# Patient Record
Sex: MALE | Race: BLACK OR AFRICAN AMERICAN | NOT HISPANIC OR LATINO | Employment: OTHER | RURAL
[De-identification: names, ages, dates, MRNs, and addresses within clinical notes are randomized per-mention and may not be internally consistent; named-entity substitution may affect disease eponyms.]

---

## 2021-05-17 ENCOUNTER — OFFICE VISIT (OUTPATIENT)
Dept: INTERNAL MEDICINE | Facility: CLINIC | Age: 86
End: 2021-05-17
Payer: MEDICARE

## 2021-05-17 VITALS
HEIGHT: 70 IN | RESPIRATION RATE: 16 BRPM | DIASTOLIC BLOOD PRESSURE: 86 MMHG | OXYGEN SATURATION: 100 % | HEART RATE: 74 BPM | BODY MASS INDEX: 22.62 KG/M2 | SYSTOLIC BLOOD PRESSURE: 170 MMHG | WEIGHT: 158 LBS

## 2021-05-17 DIAGNOSIS — I10 ESSENTIAL HYPERTENSION: Primary | Chronic | ICD-10-CM

## 2021-05-17 PROCEDURE — 99204 OFFICE O/P NEW MOD 45 MIN: CPT | Mod: PBBFAC | Performed by: INTERNAL MEDICINE

## 2021-05-17 PROCEDURE — 99212 PR OFFICE/OUTPT VISIT, EST, LEVL II, 10-19 MIN: ICD-10-PCS | Mod: S$PBB,,, | Performed by: INTERNAL MEDICINE

## 2021-05-17 PROCEDURE — 99212 OFFICE O/P EST SF 10 MIN: CPT | Mod: S$PBB,,, | Performed by: INTERNAL MEDICINE

## 2021-05-17 RX ORDER — TRAVOPROST OPHTHALMIC SOLUTION 0.04 MG/ML
1 SOLUTION OPHTHALMIC NIGHTLY
COMMUNITY
Start: 2021-03-12

## 2021-05-17 RX ORDER — HYDROCHLOROTHIAZIDE 25 MG/1
25 TABLET ORAL DAILY
COMMUNITY
Start: 2021-03-24 | End: 2022-02-02 | Stop reason: SDUPTHER

## 2021-05-17 RX ORDER — TIMOLOL MALEATE 5 MG/ML
SOLUTION/ DROPS OPHTHALMIC
COMMUNITY
Start: 2021-03-12

## 2021-05-17 RX ORDER — LISINOPRIL 40 MG/1
20 TABLET ORAL DAILY
COMMUNITY
End: 2022-02-02

## 2021-05-17 RX ORDER — BICALUTAMIDE 50 MG/1
50 TABLET, FILM COATED ORAL DAILY
COMMUNITY
Start: 2021-03-08 | End: 2022-02-02 | Stop reason: SDUPTHER

## 2021-05-17 RX ORDER — TERAZOSIN 2 MG/1
2 CAPSULE ORAL NIGHTLY
COMMUNITY
End: 2021-11-02 | Stop reason: SDUPTHER

## 2021-06-07 ENCOUNTER — OFFICE VISIT (OUTPATIENT)
Dept: INTERNAL MEDICINE | Facility: CLINIC | Age: 86
End: 2021-06-07
Payer: MEDICARE

## 2021-06-07 VITALS
SYSTOLIC BLOOD PRESSURE: 160 MMHG | HEIGHT: 69 IN | HEART RATE: 64 BPM | BODY MASS INDEX: 23.11 KG/M2 | WEIGHT: 156 LBS | OXYGEN SATURATION: 100 % | DIASTOLIC BLOOD PRESSURE: 88 MMHG | RESPIRATION RATE: 17 BRPM

## 2021-06-07 DIAGNOSIS — I10 ESSENTIAL HYPERTENSION: Primary | Chronic | ICD-10-CM

## 2021-06-07 PROCEDURE — 99214 OFFICE O/P EST MOD 30 MIN: CPT | Mod: PBBFAC | Performed by: INTERNAL MEDICINE

## 2021-06-07 PROCEDURE — 99212 OFFICE O/P EST SF 10 MIN: CPT | Mod: S$PBB,,, | Performed by: INTERNAL MEDICINE

## 2021-06-07 PROCEDURE — 99212 PR OFFICE/OUTPT VISIT, EST, LEVL II, 10-19 MIN: ICD-10-PCS | Mod: S$PBB,,, | Performed by: INTERNAL MEDICINE

## 2021-07-28 ENCOUNTER — OFFICE VISIT (OUTPATIENT)
Dept: FAMILY MEDICINE | Facility: CLINIC | Age: 86
End: 2021-07-28
Payer: MEDICARE

## 2021-07-28 VITALS
HEIGHT: 69 IN | HEART RATE: 71 BPM | WEIGHT: 159 LBS | OXYGEN SATURATION: 99 % | BODY MASS INDEX: 23.55 KG/M2 | DIASTOLIC BLOOD PRESSURE: 90 MMHG | SYSTOLIC BLOOD PRESSURE: 152 MMHG | RESPIRATION RATE: 18 BRPM

## 2021-07-28 DIAGNOSIS — I10 ESSENTIAL HYPERTENSION: Primary | ICD-10-CM

## 2021-07-28 PROCEDURE — 99212 OFFICE O/P EST SF 10 MIN: CPT | Mod: ,,, | Performed by: INTERNAL MEDICINE

## 2021-07-28 PROCEDURE — 99212 PR OFFICE/OUTPT VISIT, EST, LEVL II, 10-19 MIN: ICD-10-PCS | Mod: ,,, | Performed by: INTERNAL MEDICINE

## 2021-07-28 RX ORDER — LISINOPRIL 40 MG/1
40 TABLET ORAL DAILY
COMMUNITY
Start: 2021-07-02 | End: 2022-02-02

## 2021-10-27 RX ORDER — HYDROCHLOROTHIAZIDE 25 MG/1
25 TABLET ORAL DAILY
Qty: 30 TABLET | Refills: 3 | Status: SHIPPED | OUTPATIENT
Start: 2021-10-27 | End: 2022-01-31

## 2021-11-02 ENCOUNTER — OFFICE VISIT (OUTPATIENT)
Dept: FAMILY MEDICINE | Facility: CLINIC | Age: 86
End: 2021-11-02
Payer: MEDICARE

## 2021-11-02 VITALS
RESPIRATION RATE: 16 BRPM | DIASTOLIC BLOOD PRESSURE: 85 MMHG | HEIGHT: 69 IN | BODY MASS INDEX: 23.25 KG/M2 | SYSTOLIC BLOOD PRESSURE: 141 MMHG | OXYGEN SATURATION: 100 % | WEIGHT: 157 LBS | HEART RATE: 74 BPM

## 2021-11-02 DIAGNOSIS — N40.0 BENIGN PROSTATIC HYPERPLASIA, UNSPECIFIED WHETHER LOWER URINARY TRACT SYMPTOMS PRESENT: Chronic | ICD-10-CM

## 2021-11-02 DIAGNOSIS — I10 ESSENTIAL HYPERTENSION: Primary | Chronic | ICD-10-CM

## 2021-11-02 PROCEDURE — G0008 ADMIN INFLUENZA VIRUS VAC: HCPCS | Mod: ,,, | Performed by: INTERNAL MEDICINE

## 2021-11-02 PROCEDURE — 90662 FLU VACCINE - QUADRIVALENT - HIGH DOSE (65+) PRESERVATIVE FREE IM: ICD-10-PCS | Mod: ,,, | Performed by: INTERNAL MEDICINE

## 2021-11-02 PROCEDURE — 90662 IIV NO PRSV INCREASED AG IM: CPT | Mod: ,,, | Performed by: INTERNAL MEDICINE

## 2021-11-02 PROCEDURE — G0008 FLU VACCINE - QUADRIVALENT - HIGH DOSE (65+) PRESERVATIVE FREE IM: ICD-10-PCS | Mod: ,,, | Performed by: INTERNAL MEDICINE

## 2021-11-02 PROCEDURE — 99214 OFFICE O/P EST MOD 30 MIN: CPT | Mod: ,,, | Performed by: INTERNAL MEDICINE

## 2021-11-02 PROCEDURE — 99214 PR OFFICE/OUTPT VISIT, EST, LEVL IV, 30-39 MIN: ICD-10-PCS | Mod: ,,, | Performed by: INTERNAL MEDICINE

## 2021-11-02 RX ORDER — TERAZOSIN 2 MG/1
2 CAPSULE ORAL NIGHTLY
Qty: 90 CAPSULE | Refills: 1 | Status: SHIPPED | OUTPATIENT
Start: 2021-11-02 | End: 2022-08-08 | Stop reason: SDUPTHER

## 2022-02-02 ENCOUNTER — OFFICE VISIT (OUTPATIENT)
Dept: FAMILY MEDICINE | Facility: CLINIC | Age: 87
End: 2022-02-02
Payer: MEDICARE

## 2022-02-02 VITALS
SYSTOLIC BLOOD PRESSURE: 98 MMHG | TEMPERATURE: 98 F | DIASTOLIC BLOOD PRESSURE: 62 MMHG | BODY MASS INDEX: 23.58 KG/M2 | HEIGHT: 69 IN | OXYGEN SATURATION: 97 % | RESPIRATION RATE: 17 BRPM | HEART RATE: 82 BPM | WEIGHT: 159.19 LBS

## 2022-02-02 DIAGNOSIS — N40.0 BENIGN PROSTATIC HYPERPLASIA, UNSPECIFIED WHETHER LOWER URINARY TRACT SYMPTOMS PRESENT: ICD-10-CM

## 2022-02-02 DIAGNOSIS — I95.9 HYPOTENSION, UNSPECIFIED HYPOTENSION TYPE: Primary | ICD-10-CM

## 2022-02-02 DIAGNOSIS — I10 HYPERTENSION, UNSPECIFIED TYPE: Chronic | ICD-10-CM

## 2022-02-02 LAB
ANION GAP SERPL CALCULATED.3IONS-SCNC: 13 MMOL/L (ref 7–16)
BASOPHILS # BLD AUTO: 0.02 K/UL (ref 0–0.2)
BASOPHILS NFR BLD AUTO: 0.4 % (ref 0–1)
BUN SERPL-MCNC: 47 MG/DL (ref 7–18)
BUN/CREAT SERPL: 19 (ref 6–20)
CALCIUM SERPL-MCNC: 9.4 MG/DL (ref 8.5–10.1)
CHLORIDE SERPL-SCNC: 104 MMOL/L (ref 98–107)
CO2 SERPL-SCNC: 25 MMOL/L (ref 21–32)
CREAT SERPL-MCNC: 2.46 MG/DL (ref 0.7–1.3)
DIFFERENTIAL METHOD BLD: ABNORMAL
EOSINOPHIL # BLD AUTO: 0.13 K/UL (ref 0–0.5)
EOSINOPHIL NFR BLD AUTO: 2.4 % (ref 1–4)
ERYTHROCYTE [DISTWIDTH] IN BLOOD BY AUTOMATED COUNT: 11.9 % (ref 11.5–14.5)
GLUCOSE SERPL-MCNC: 102 MG/DL (ref 74–106)
HCT VFR BLD AUTO: 31.6 % (ref 40–54)
HGB BLD-MCNC: 10.3 G/DL (ref 13.5–18)
IMM GRANULOCYTES # BLD AUTO: 0.01 K/UL (ref 0–0.04)
IMM GRANULOCYTES NFR BLD: 0.2 % (ref 0–0.4)
LYMPHOCYTES # BLD AUTO: 1.41 K/UL (ref 1–4.8)
LYMPHOCYTES NFR BLD AUTO: 26.4 % (ref 27–41)
MCH RBC QN AUTO: 31 PG (ref 27–31)
MCHC RBC AUTO-ENTMCNC: 32.6 G/DL (ref 32–36)
MCV RBC AUTO: 95.2 FL (ref 80–96)
MONOCYTES # BLD AUTO: 0.8 K/UL (ref 0–0.8)
MONOCYTES NFR BLD AUTO: 15 % (ref 2–6)
MPC BLD CALC-MCNC: 10.4 FL (ref 9.4–12.4)
NEUTROPHILS # BLD AUTO: 2.97 K/UL (ref 1.8–7.7)
NEUTROPHILS NFR BLD AUTO: 55.6 % (ref 53–65)
NRBC # BLD AUTO: 0 X10E3/UL
NRBC, AUTO (.00): 0 %
PLATELET # BLD AUTO: 201 K/UL (ref 150–400)
POTASSIUM SERPL-SCNC: 4.2 MMOL/L (ref 3.5–5.1)
RBC # BLD AUTO: 3.32 M/UL (ref 4.6–6.2)
SODIUM SERPL-SCNC: 138 MMOL/L (ref 136–145)
WBC # BLD AUTO: 5.34 K/UL (ref 4.5–11)

## 2022-02-02 PROCEDURE — 80048 BASIC METABOLIC PANEL: ICD-10-PCS | Mod: ,,, | Performed by: CLINICAL MEDICAL LABORATORY

## 2022-02-02 PROCEDURE — 99214 PR OFFICE/OUTPT VISIT, EST, LEVL IV, 30-39 MIN: ICD-10-PCS | Mod: ,,, | Performed by: INTERNAL MEDICINE

## 2022-02-02 PROCEDURE — 85025 CBC WITH DIFFERENTIAL: ICD-10-PCS | Mod: ,,, | Performed by: CLINICAL MEDICAL LABORATORY

## 2022-02-02 PROCEDURE — 85025 COMPLETE CBC W/AUTO DIFF WBC: CPT | Mod: ,,, | Performed by: CLINICAL MEDICAL LABORATORY

## 2022-02-02 PROCEDURE — 80048 BASIC METABOLIC PNL TOTAL CA: CPT | Mod: ,,, | Performed by: CLINICAL MEDICAL LABORATORY

## 2022-02-02 PROCEDURE — 99214 OFFICE O/P EST MOD 30 MIN: CPT | Mod: ,,, | Performed by: INTERNAL MEDICINE

## 2022-02-02 RX ORDER — HYDROCHLOROTHIAZIDE 25 MG/1
25 TABLET ORAL DAILY
Qty: 90 TABLET | Refills: 1 | Status: SHIPPED | OUTPATIENT
Start: 2022-02-02 | End: 2022-05-17 | Stop reason: SDUPTHER

## 2022-02-02 RX ORDER — LISINOPRIL 20 MG/1
20 TABLET ORAL EVERY MORNING
Qty: 90 TABLET | Refills: 1 | Status: SHIPPED | OUTPATIENT
Start: 2022-02-02 | End: 2022-07-27 | Stop reason: SDUPTHER

## 2022-02-02 RX ORDER — LISINOPRIL 40 MG/1
20 TABLET ORAL DAILY
Qty: 90 TABLET | Refills: 1 | Status: CANCELLED | OUTPATIENT
Start: 2022-02-02

## 2022-02-02 RX ORDER — BICALUTAMIDE 50 MG/1
50 TABLET, FILM COATED ORAL DAILY
Qty: 90 TABLET | Refills: 1 | Status: SHIPPED | OUTPATIENT
Start: 2022-02-02 | End: 2022-08-17 | Stop reason: SDUPTHER

## 2022-02-02 NOTE — PATIENT INSTRUCTIONS
Khoa was seen today for follow-up, hypertension and medication refill.    Diagnoses and all orders for this visit:    Hypotension, unspecified hypotension type  Comments:  Decrease med    Benign prostatic hyperplasia, unspecified whether lower urinary tract symptoms present  Comments:  Labs ordered    Hypertension, unspecified type  -     Basic Metabolic Panel; Future  -     CBC Auto Differential; Future  -     Basic Metabolic Panel  -     CBC Auto Differential    Other orders  The following orders have not been finalized:  -     bicalutamide (CASODEX) 50 MG Tab  -     hydroCHLOROthiazide (HYDRODIURIL) 25 MG tablet  -     Cancel: lisinopriL (PRINIVIL,ZESTRIL) 40 MG tablet  -     lisinopriL (PRINIVIL,ZESTRIL) 20 MG tablet

## 2022-02-02 NOTE — PROGRESS NOTES
Subjective:       Patient ID: Khoa Phan is a 89 y.o. male.    Chief Complaint: Follow-up, Hypertension, and Medication Refill    Patient is here for follow up evaluation. Blood pressure is 98/62 today. Will order in house labs today. Will decrease Lisinopril to 20mg one PO QAM. Follow up in 2 weeks.       Current Medications:    Current Outpatient Medications:     bicalutamide (CASODEX) 50 MG Tab, Take 50 mg by mouth once daily., Disp: , Rfl:     hydroCHLOROthiazide (HYDRODIURIL) 25 MG tablet, Take 25 mg by mouth once daily., Disp: , Rfl:     hydroCHLOROthiazide (HYDRODIURIL) 25 MG tablet, TAKE 1 TABLET(25 MG) BY MOUTH EVERY DAY, Disp: 30 tablet, Rfl: 3    terazosin (HYTRIN) 2 MG capsule, Take 1 capsule (2 mg total) by mouth every evening., Disp: 90 capsule, Rfl: 1    timolol maleate 0.5% (TIMOPTIC) 0.5 % Drop, INSTILL 1 DROP INTO BOTH EYES IN THE MORNING, Disp: , Rfl:     travoprost (TRAVATAN Z) 0.004 % ophthalmic solution, Place 1 drop into both eyes nightly., Disp: , Rfl:     Last Labs:     No visits with results within 1 Month(s) from this visit.   Latest known visit with results is:   No results found for any previous visit.       Last Imaging:  No image results found.         Review of Systems   All other systems reviewed and are negative.        Objective:      Physical Exam  Vitals reviewed.   Constitutional:       Appearance: Normal appearance. He is obese.   Cardiovascular:      Rate and Rhythm: Normal rate and regular rhythm.      Pulses: Normal pulses.      Heart sounds: Normal heart sounds.   Pulmonary:      Effort: Pulmonary effort is normal.      Breath sounds: Normal breath sounds.   Abdominal:      General: Abdomen is flat. Bowel sounds are normal.      Palpations: Abdomen is soft.   Musculoskeletal:         General: Normal range of motion.      Cervical back: Normal range of motion and neck supple.   Skin:     General: Skin is warm and dry.   Neurological:      General: No focal  deficit present.      Mental Status: He is alert and oriented to person, place, and time. Mental status is at baseline.         Assessment:       1. Hypotension, unspecified hypotension type      Decrease med   2. Benign prostatic hyperplasia, unspecified whether lower urinary tract symptoms present      Labs ordered   3. Hypertension, unspecified type  Basic Metabolic Panel    CBC Auto Differential    Basic Metabolic Panel    CBC Auto Differential        Plan:         Khoa was seen today for follow-up, hypertension and medication refill.    Diagnoses and all orders for this visit:    Hypotension, unspecified hypotension type  Comments:  Decrease med    Benign prostatic hyperplasia, unspecified whether lower urinary tract symptoms present  Comments:  Labs ordered    Hypertension, unspecified type  -     Basic Metabolic Panel; Future  -     CBC Auto Differential; Future  -     Basic Metabolic Panel  -     CBC Auto Differential    Other orders  The following orders have not been finalized:  -     bicalutamide (CASODEX) 50 MG Tab  -     hydroCHLOROthiazide (HYDRODIURIL) 25 MG tablet  -     Cancel: lisinopriL (PRINIVIL,ZESTRIL) 40 MG tablet  -     lisinopriL (PRINIVIL,ZESTRIL) 20 MG tablet       Follow up in 2 weeks.

## 2022-02-17 ENCOUNTER — OFFICE VISIT (OUTPATIENT)
Dept: FAMILY MEDICINE | Facility: CLINIC | Age: 87
End: 2022-02-17
Payer: MEDICARE

## 2022-02-17 VITALS
RESPIRATION RATE: 16 BRPM | OXYGEN SATURATION: 98 % | DIASTOLIC BLOOD PRESSURE: 72 MMHG | HEART RATE: 75 BPM | BODY MASS INDEX: 26.49 KG/M2 | WEIGHT: 159 LBS | TEMPERATURE: 99 F | SYSTOLIC BLOOD PRESSURE: 118 MMHG | HEIGHT: 65 IN

## 2022-02-17 DIAGNOSIS — N18.31 STAGE 3A CHRONIC KIDNEY DISEASE: Chronic | ICD-10-CM

## 2022-02-17 DIAGNOSIS — N18.31 ANEMIA DUE TO STAGE 3A CHRONIC KIDNEY DISEASE: Chronic | ICD-10-CM

## 2022-02-17 DIAGNOSIS — D63.1 ANEMIA DUE TO STAGE 3A CHRONIC KIDNEY DISEASE: Chronic | ICD-10-CM

## 2022-02-17 DIAGNOSIS — I10 ESSENTIAL HYPERTENSION: Primary | Chronic | ICD-10-CM

## 2022-02-17 PROCEDURE — 99213 OFFICE O/P EST LOW 20 MIN: CPT | Mod: ,,, | Performed by: INTERNAL MEDICINE

## 2022-02-17 PROCEDURE — 99213 PR OFFICE/OUTPT VISIT, EST, LEVL III, 20-29 MIN: ICD-10-PCS | Mod: ,,, | Performed by: INTERNAL MEDICINE

## 2022-02-17 NOTE — PROGRESS NOTES
Subjective:       Patient ID: Khoa Phan is a 89 y.o. male.    Chief Complaint: Follow-up, Hypertension, and Results (Follow up abnormal labs. )    Patient is here for follow up evaluation. Reviewed previous labs with patient and noted abnormal kidney function. Patient has a history of chronic kidney disease and lab is consistent with this. Chronic Kidney Disease is stable. Patient has a history of anemia. Anemia is stable. Patient states he feels ok and has no complaints. Encouraged patient to stay hydrated. Will order labs for 6 weeks. Follow up in 3 months.      Current Medications:    Current Outpatient Medications:     bicalutamide (CASODEX) 50 MG Tab, Take 1 tablet (50 mg total) by mouth once daily., Disp: 90 tablet, Rfl: 1    hydroCHLOROthiazide (HYDRODIURIL) 25 MG tablet, TAKE 1 TABLET(25 MG) BY MOUTH EVERY DAY, Disp: 30 tablet, Rfl: 3    hydroCHLOROthiazide (HYDRODIURIL) 25 MG tablet, Take 1 tablet (25 mg total) by mouth once daily., Disp: 90 tablet, Rfl: 1    lisinopriL (PRINIVIL,ZESTRIL) 20 MG tablet, Take 1 tablet (20 mg total) by mouth every morning., Disp: 90 tablet, Rfl: 1    terazosin (HYTRIN) 2 MG capsule, Take 1 capsule (2 mg total) by mouth every evening., Disp: 90 capsule, Rfl: 1    timolol maleate 0.5% (TIMOPTIC) 0.5 % Drop, INSTILL 1 DROP INTO BOTH EYES IN THE MORNING, Disp: , Rfl:     travoprost (TRAVATAN Z) 0.004 % ophthalmic solution, Place 1 drop into both eyes nightly., Disp: , Rfl:     Last Labs:     Office Visit on 02/02/2022   Component Date Value    Sodium 02/02/2022 138     Potassium 02/02/2022 4.2     Chloride 02/02/2022 104     CO2 02/02/2022 25     Anion Gap 02/02/2022 13     Glucose 02/02/2022 102     BUN 02/02/2022 47*    Creatinine 02/02/2022 2.46*    BUN/Creatinine Ratio 02/02/2022 19     Calcium 02/02/2022 9.4     eGFR 02/02/2022 26*    WBC 02/02/2022 5.34     RBC 02/02/2022 3.32*    Hemoglobin 02/02/2022 10.3*    Hematocrit 02/02/2022 31.6*     MCV 02/02/2022 95.2     MCH 02/02/2022 31.0     MCHC 02/02/2022 32.6     RDW 02/02/2022 11.9     Platelet Count 02/02/2022 201     MPV 02/02/2022 10.4     Neutrophils % 02/02/2022 55.6     Lymphocytes % 02/02/2022 26.4*    Monocytes % 02/02/2022 15.0*    Eosinophils % 02/02/2022 2.4     Basophils % 02/02/2022 0.4     Immature Granulocytes % 02/02/2022 0.2     nRBC, Auto 02/02/2022 0.0     Neutrophils, Abs 02/02/2022 2.97     Lymphocytes, Absolute 02/02/2022 1.41     Monocytes, Absolute 02/02/2022 0.80     Eosinophils, Absolute 02/02/2022 0.13     Basophils, Absolute 02/02/2022 0.02     Immature Granulocytes, A* 02/02/2022 0.01     nRBC, Absolute 02/02/2022 0.00     Diff Type 02/02/2022 Auto        Last Imaging:  No image results found.         Review of Systems   All other systems reviewed and are negative.        Objective:      Physical Exam  Vitals reviewed.   Constitutional:       Appearance: Normal appearance. He is obese.   Cardiovascular:      Rate and Rhythm: Normal rate and regular rhythm.      Pulses: Normal pulses.      Heart sounds: Normal heart sounds.   Pulmonary:      Effort: Pulmonary effort is normal.      Breath sounds: Normal breath sounds.   Abdominal:      General: Abdomen is flat. Bowel sounds are normal.      Palpations: Abdomen is soft.   Musculoskeletal:         General: Normal range of motion.      Cervical back: Normal range of motion and neck supple.   Skin:     General: Skin is warm and dry.   Neurological:      General: No focal deficit present.      Mental Status: He is alert and oriented to person, place, and time. Mental status is at baseline.         Assessment:       1. Essential hypertension  Basic Metabolic Panel    Stable   2. Stage 3a chronic kidney disease      Stable   3. 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

## 2022-02-17 NOTE — PATIENT INSTRUCTIONS
Khoa was seen today for follow-up, hypertension and results.    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

## 2022-03-11 DIAGNOSIS — Z71.89 COMPLEX CARE COORDINATION: ICD-10-CM

## 2022-04-30 ENCOUNTER — EXTERNAL CHRONIC CARE MANAGEMENT (OUTPATIENT)
Dept: INTERNAL MEDICINE | Facility: CLINIC | Age: 87
End: 2022-04-30
Payer: MEDICARE

## 2022-04-30 PROCEDURE — G0511 CCM/BHI BY RHC/FQHC 20MIN MO: HCPCS | Mod: ,,, | Performed by: INTERNAL MEDICINE

## 2022-04-30 PROCEDURE — G0511 PR CHRONIC CARE MGMT, RHC OR FQHC ONLY, 20 MINS OR MORE: ICD-10-PCS | Mod: ,,, | Performed by: INTERNAL MEDICINE

## 2022-05-17 ENCOUNTER — OFFICE VISIT (OUTPATIENT)
Dept: FAMILY MEDICINE | Facility: CLINIC | Age: 87
End: 2022-05-17
Payer: MEDICARE

## 2022-05-17 VITALS
HEART RATE: 76 BPM | RESPIRATION RATE: 20 BRPM | WEIGHT: 157.63 LBS | DIASTOLIC BLOOD PRESSURE: 80 MMHG | SYSTOLIC BLOOD PRESSURE: 136 MMHG | OXYGEN SATURATION: 99 % | TEMPERATURE: 98 F | BODY MASS INDEX: 26.26 KG/M2 | HEIGHT: 65 IN

## 2022-05-17 DIAGNOSIS — D64.9 ANEMIA, UNSPECIFIED TYPE: ICD-10-CM

## 2022-05-17 DIAGNOSIS — I10 ESSENTIAL HYPERTENSION: Primary | ICD-10-CM

## 2022-05-17 DIAGNOSIS — N18.31 STAGE 3A CHRONIC KIDNEY DISEASE: ICD-10-CM

## 2022-05-17 LAB
ANION GAP SERPL CALCULATED.3IONS-SCNC: 11 MMOL/L (ref 7–16)
BUN SERPL-MCNC: 38 MG/DL (ref 7–18)
BUN/CREAT SERPL: 15 (ref 6–20)
CALCIUM SERPL-MCNC: 8.8 MG/DL (ref 8.5–10.1)
CHLORIDE SERPL-SCNC: 106 MMOL/L (ref 98–107)
CO2 SERPL-SCNC: 24 MMOL/L (ref 21–32)
CREAT SERPL-MCNC: 2.46 MG/DL (ref 0.7–1.3)
GLUCOSE SERPL-MCNC: 86 MG/DL (ref 74–106)
POTASSIUM SERPL-SCNC: 4 MMOL/L (ref 3.5–5.1)
SODIUM SERPL-SCNC: 137 MMOL/L (ref 136–145)

## 2022-05-17 PROCEDURE — 99214 OFFICE O/P EST MOD 30 MIN: CPT | Mod: ,,, | Performed by: INTERNAL MEDICINE

## 2022-05-17 PROCEDURE — 80048 BASIC METABOLIC PANEL: ICD-10-PCS | Mod: ,,, | Performed by: CLINICAL MEDICAL LABORATORY

## 2022-05-17 PROCEDURE — 80048 BASIC METABOLIC PNL TOTAL CA: CPT | Mod: ,,, | Performed by: CLINICAL MEDICAL LABORATORY

## 2022-05-17 PROCEDURE — 99214 PR OFFICE/OUTPT VISIT, EST, LEVL IV, 30-39 MIN: ICD-10-PCS | Mod: ,,, | Performed by: INTERNAL MEDICINE

## 2022-05-17 RX ORDER — HYDROCHLOROTHIAZIDE 25 MG/1
25 TABLET ORAL DAILY
Qty: 90 TABLET | Refills: 1 | Status: SHIPPED | OUTPATIENT
Start: 2022-05-17 | End: 2023-01-26

## 2022-05-17 NOTE — PROGRESS NOTES
Subjective:       Patient ID: Khoa Phan is a 89 y.o. male.    Chief Complaint: Follow-up and Hypertension    Patient is here today for a follow up evaluation. Patient pressure is stable today. Patient has no new complaints. Will refer to Nephrology. Will order lab work today. Will follow in 3 months.       Current Medications:    Current Outpatient Medications:     bicalutamide (CASODEX) 50 MG Tab, Take 1 tablet (50 mg total) by mouth once daily., Disp: 90 tablet, Rfl: 1    hydroCHLOROthiazide (HYDRODIURIL) 25 MG tablet, Take 1 tablet (25 mg total) by mouth once daily., Disp: 90 tablet, Rfl: 1    lisinopriL (PRINIVIL,ZESTRIL) 20 MG tablet, Take 1 tablet (20 mg total) by mouth every morning., Disp: 90 tablet, Rfl: 1    terazosin (HYTRIN) 2 MG capsule, Take 1 capsule (2 mg total) by mouth every evening., Disp: 90 capsule, Rfl: 1    timolol maleate 0.5% (TIMOPTIC) 0.5 % Drop, INSTILL 1 DROP INTO BOTH EYES IN THE MORNING, Disp: , Rfl:     travoprost (TRAVATAN Z) 0.004 % ophthalmic solution, Place 1 drop into both eyes nightly., Disp: , Rfl:     Last Labs:     No visits with results within 1 Month(s) from this visit.   Latest known visit with results is:   Office Visit on 02/02/2022   Component Date Value    Sodium 02/02/2022 138     Potassium 02/02/2022 4.2     Chloride 02/02/2022 104     CO2 02/02/2022 25     Anion Gap 02/02/2022 13     Glucose 02/02/2022 102     BUN 02/02/2022 47 (A)    Creatinine 02/02/2022 2.46 (A)    BUN/Creatinine Ratio 02/02/2022 19     Calcium 02/02/2022 9.4     eGFR 02/02/2022 26 (A)    WBC 02/02/2022 5.34     RBC 02/02/2022 3.32 (A)    Hemoglobin 02/02/2022 10.3 (A)    Hematocrit 02/02/2022 31.6 (A)    MCV 02/02/2022 95.2     MCH 02/02/2022 31.0     MCHC 02/02/2022 32.6     RDW 02/02/2022 11.9     Platelet Count 02/02/2022 201     MPV 02/02/2022 10.4     Neutrophils % 02/02/2022 55.6     Lymphocytes % 02/02/2022 26.4 (A)    Monocytes % 02/02/2022 15.0 (A)     Eosinophils % 02/02/2022 2.4     Basophils % 02/02/2022 0.4     Immature Granulocytes % 02/02/2022 0.2     nRBC, Auto 02/02/2022 0.0     Neutrophils, Abs 02/02/2022 2.97     Lymphocytes, Absolute 02/02/2022 1.41     Monocytes, Absolute 02/02/2022 0.80     Eosinophils, Absolute 02/02/2022 0.13     Basophils, Absolute 02/02/2022 0.02     Immature Granulocytes, A* 02/02/2022 0.01     nRBC, Absolute 02/02/2022 0.00     Diff Type 02/02/2022 Auto        Last Imaging:  No image results found.         Review of Systems   All other systems reviewed and are negative.        Objective:      Physical Exam  Constitutional:       Appearance: Normal appearance. He is normal weight.   Cardiovascular:      Rate and Rhythm: Normal rate and regular rhythm.      Pulses: Normal pulses.      Heart sounds: Normal heart sounds.   Pulmonary:      Effort: Pulmonary effort is normal.      Breath sounds: Normal breath sounds.   Abdominal:      General: Abdomen is flat. Bowel sounds are normal.      Palpations: Abdomen is soft.   Musculoskeletal:         General: Normal range of motion.      Cervical back: Normal range of motion and neck supple.   Skin:     General: Skin is warm and dry.   Neurological:      General: No focal deficit present.      Mental Status: He is alert and oriented to person, place, and time. Mental status is at baseline.         Assessment:       1. Essential hypertension  Basic Metabolic Panel    Basic Metabolic Panel   2. Stage 3a chronic kidney disease  Basic Metabolic Panel    Ambulatory referral/consult to Nephrology    Basic Metabolic Panel   3. Anemia, unspecified type          Plan:         Khoa was seen today for follow-up and hypertension.    Diagnoses and all orders for this visit:    Essential hypertension  -     Basic Metabolic Panel; Future  -     Basic Metabolic Panel    Stage 3a chronic kidney disease  -     Basic Metabolic Panel; Future  -     Ambulatory referral/consult to Nephrology;  Future  -     Basic Metabolic Panel    Anemia, unspecified type    Other orders  The following orders have not been finalized:  -     hydroCHLOROthiazide (HYDRODIURIL) 25 MG tablet

## 2022-05-17 NOTE — PATIENT INSTRUCTIONS
Khoa was seen today for follow-up and hypertension.    Diagnoses and all orders for this visit:    Essential hypertension  -     Basic Metabolic Panel; Future  -     Basic Metabolic Panel    Stage 3a chronic kidney disease  -     Basic Metabolic Panel; Future  -     Ambulatory referral/consult to Nephrology; Future  -     Basic Metabolic Panel    Anemia, unspecified type    Other orders  The following orders have not been finalized:  -     hydroCHLOROthiazide (HYDRODIURIL) 25 MG tablet

## 2022-05-20 ENCOUNTER — HOSPITAL ENCOUNTER (EMERGENCY)
Facility: HOSPITAL | Age: 87
Discharge: HOME | End: 2022-05-20
Payer: MEDICARE

## 2022-05-20 VITALS
SYSTOLIC BLOOD PRESSURE: 111 MMHG | DIASTOLIC BLOOD PRESSURE: 60 MMHG | OXYGEN SATURATION: 97 % | TEMPERATURE: 98 F | HEART RATE: 76 BPM | RESPIRATION RATE: 18 BRPM | BODY MASS INDEX: 26.46 KG/M2 | WEIGHT: 155 LBS | HEIGHT: 64 IN

## 2022-05-20 DIAGNOSIS — M16.11 PRIMARY OSTEOARTHRITIS OF RIGHT HIP: ICD-10-CM

## 2022-05-20 DIAGNOSIS — M25.559 HIP PAIN: Primary | ICD-10-CM

## 2022-05-20 PROCEDURE — 96372 THER/PROPH/DIAG INJ SC/IM: CPT

## 2022-05-20 PROCEDURE — 99283 EMERGENCY DEPT VISIT LOW MDM: CPT | Mod: ,,, | Performed by: NURSE PRACTITIONER

## 2022-05-20 PROCEDURE — 99284 EMERGENCY DEPT VISIT MOD MDM: CPT

## 2022-05-20 PROCEDURE — 63600175 PHARM REV CODE 636 W HCPCS: Performed by: NURSE PRACTITIONER

## 2022-05-20 PROCEDURE — 99283 PR EMERGENCY DEPT VISIT,LEVEL III: ICD-10-PCS | Mod: ,,, | Performed by: NURSE PRACTITIONER

## 2022-05-20 RX ORDER — KETOROLAC TROMETHAMINE 30 MG/ML
15 INJECTION, SOLUTION INTRAMUSCULAR; INTRAVENOUS
Status: COMPLETED | OUTPATIENT
Start: 2022-05-20 | End: 2022-05-20

## 2022-05-20 RX ADMIN — KETOROLAC TROMETHAMINE 15 MG: 30 INJECTION, SOLUTION INTRAMUSCULAR at 11:05

## 2022-05-20 NOTE — ED TRIAGE NOTES
Patient presents with right hip pain that started Monday. Patient states he think he twisted wrong. No other injury noted.

## 2022-05-20 NOTE — ED PROVIDER NOTES
"Encounter Date: 5/20/2022       History     Chief Complaint   Patient presents with    Hip Pain     89 year old male presents to ED with complaint of right hip pain that started after he was throwing bushes/branches into a ditch. He denies fall onto hip or recent injury. Endorses pain to top of hip that he describes as "pain" and is unable to characterize the pain.     The history is provided by the patient and a relative.   Hip Pain  This is a new problem. The current episode started 3 to 5 hours ago. The problem occurs constantly. The problem has not changed since onset.The symptoms are aggravated by walking and twisting. Nothing relieves the symptoms. He has tried nothing for the symptoms. The treatment provided no relief.     Review of patient's allergies indicates:   Allergen Reactions    Alfuzosin      Other reaction(s): Unknown     Past Medical History:   Diagnosis Date    BPH (benign prostatic hyperplasia)     Hypertension      Past Surgical History:   Procedure Laterality Date    HEMORRHOID SURGERY       Family History   Problem Relation Age of Onset    Diabetes Brother     Stroke Brother     Heart disease Brother     Hypertension Neg Hx      Social History     Tobacco Use    Smoking status: Never Smoker    Smokeless tobacco: Never Used   Substance Use Topics    Alcohol use: Never    Drug use: Never     Review of Systems   Musculoskeletal: Positive for arthralgias. Negative for joint swelling.   Neurological: Positive for weakness. Negative for dizziness and numbness.   All other systems reviewed and are negative.      Physical Exam     Initial Vitals [05/20/22 1031]   BP Pulse Resp Temp SpO2   111/60 76 18 98.3 °F (36.8 °C) 97 %      MAP       --         Physical Exam    Nursing note and vitals reviewed.  Constitutional: He appears well-developed and well-nourished.   HENT:   Head: Normocephalic and atraumatic.   Eyes: EOM are normal. Pupils are equal, round, and reactive to light.   Neck: " Neck supple.   Normal range of motion.  Cardiovascular: Normal rate and regular rhythm.   Pulmonary/Chest: Breath sounds normal. He has no wheezes. He has no rhonchi.   Abdominal: Abdomen is soft. He exhibits no distension. There is no abdominal tenderness.   Musculoskeletal:         General: Tenderness present. No edema.      Cervical back: Normal range of motion and neck supple.      Right hip: Tenderness and bony tenderness present. Decreased range of motion.        Legs:       Comments: No deformity; no leg length discrepancy     Neurological: He is alert and oriented to person, place, and time.   Skin: Skin is warm and dry. Capillary refill takes 2 to 3 seconds.   Psychiatric: He has a normal mood and affect. Thought content normal.         Medical Screening Exam   See Full Note    ED Course   Procedures  Labs Reviewed - No data to display       Imaging Results          X-Ray Hip 2 or 3 views Right (with Pelvis when performed) (Final result)  Result time 05/20/22 10:58:21    Final result by Dee Fletcher MD (05/20/22 10:58:21)                 Impression:      Demineralization and mild degenerative changes      Electronically signed by: Dee Fletcher  Date:    05/20/2022  Time:    10:58             Narrative:    EXAMINATION:  XR HIP WITH PELVIS WHEN PERFORMED, 2 OR 3  VIEWS RIGHT    CLINICAL HISTORY:  pain;    FINDINGS:  There is diffuse demineralization.  There is mild osteophyte and superior joint space loss in the right hip.  There are vascular calcifications present.  No acute right hip fracture is seen.                                 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

## 2022-05-31 ENCOUNTER — EXTERNAL CHRONIC CARE MANAGEMENT (OUTPATIENT)
Dept: FAMILY MEDICINE | Facility: CLINIC | Age: 87
End: 2022-05-31
Payer: MEDICARE

## 2022-05-31 PROCEDURE — G0511 CCM/BHI BY RHC/FQHC 20MIN MO: HCPCS | Mod: ,,, | Performed by: INTERNAL MEDICINE

## 2022-05-31 PROCEDURE — G0511 PR CHRONIC CARE MGMT, RHC OR FQHC ONLY, 20 MINS OR MORE: ICD-10-PCS | Mod: ,,, | Performed by: INTERNAL MEDICINE

## 2022-06-30 ENCOUNTER — EXTERNAL CHRONIC CARE MANAGEMENT (OUTPATIENT)
Dept: FAMILY MEDICINE | Facility: CLINIC | Age: 87
End: 2022-06-30
Payer: MEDICARE

## 2022-06-30 PROCEDURE — G0511 CCM/BHI BY RHC/FQHC 20MIN MO: HCPCS | Mod: ,,, | Performed by: INTERNAL MEDICINE

## 2022-06-30 PROCEDURE — G0511 PR CHRONIC CARE MGMT, RHC OR FQHC ONLY, 20 MINS OR MORE: ICD-10-PCS | Mod: ,,, | Performed by: INTERNAL MEDICINE

## 2022-07-27 RX ORDER — LISINOPRIL 20 MG/1
20 TABLET ORAL EVERY MORNING
Qty: 90 TABLET | Refills: 1 | Status: SHIPPED | OUTPATIENT
Start: 2022-07-27 | End: 2022-10-25

## 2022-07-27 NOTE — TELEPHONE ENCOUNTER
----- Message from Diane Gibson sent at 7/27/2022  9:30 AM CDT -----  This pt son called about getting a refill on his dad's     lisinopriL (PRINIVIL,ZESTRIL) 20 MG tablet    Shruti (son) [phone]

## 2022-07-31 ENCOUNTER — EXTERNAL CHRONIC CARE MANAGEMENT (OUTPATIENT)
Dept: FAMILY MEDICINE | Facility: CLINIC | Age: 87
End: 2022-07-31
Payer: MEDICARE

## 2022-07-31 PROCEDURE — G0511 CCM/BHI BY RHC/FQHC 20MIN MO: HCPCS | Mod: ,,, | Performed by: INTERNAL MEDICINE

## 2022-07-31 PROCEDURE — G0511 PR CHRONIC CARE MGMT, RHC OR FQHC ONLY, 20 MINS OR MORE: ICD-10-PCS | Mod: ,,, | Performed by: INTERNAL MEDICINE

## 2022-08-08 NOTE — TELEPHONE ENCOUNTER
----- Message from Rosi Hernandez sent at 8/8/2022 10:33 AM CDT -----  Patient would like a refill of this medication    terazosin (HYTRIN) 2 MG capsule 90 capsule     Patient call back number - [phone]

## 2022-08-09 RX ORDER — TERAZOSIN 2 MG/1
2 CAPSULE ORAL NIGHTLY
Qty: 90 CAPSULE | Refills: 1 | Status: SHIPPED | OUTPATIENT
Start: 2022-08-09 | End: 2022-12-01 | Stop reason: SDUPTHER

## 2022-08-17 ENCOUNTER — OFFICE VISIT (OUTPATIENT)
Dept: FAMILY MEDICINE | Facility: CLINIC | Age: 87
End: 2022-08-17
Payer: MEDICARE

## 2022-08-17 VITALS
DIASTOLIC BLOOD PRESSURE: 84 MMHG | HEIGHT: 69 IN | SYSTOLIC BLOOD PRESSURE: 130 MMHG | HEART RATE: 68 BPM | TEMPERATURE: 98 F | RESPIRATION RATE: 18 BRPM | OXYGEN SATURATION: 98 % | WEIGHT: 153.81 LBS | BODY MASS INDEX: 22.78 KG/M2

## 2022-08-17 DIAGNOSIS — R79.9 ABNORMAL FINDING OF BLOOD CHEMISTRY, UNSPECIFIED: ICD-10-CM

## 2022-08-17 DIAGNOSIS — G56.92 NEUROPATHY OF LEFT HAND: ICD-10-CM

## 2022-08-17 DIAGNOSIS — N18.30 STAGE 3 CHRONIC KIDNEY DISEASE, UNSPECIFIED WHETHER STAGE 3A OR 3B CKD: Primary | ICD-10-CM

## 2022-08-17 LAB
CHOLEST SERPL-MCNC: 216 MG/DL (ref 0–200)
CHOLEST/HDLC SERPL: 3.3 {RATIO}
HDLC SERPL-MCNC: 66 MG/DL (ref 40–60)
LDLC SERPL CALC-MCNC: 134 MG/DL
LDLC/HDLC SERPL: 2 {RATIO}
NONHDLC SERPL-MCNC: 150 MG/DL
TRIGL SERPL-MCNC: 79 MG/DL (ref 35–150)
VLDLC SERPL-MCNC: 16 MG/DL

## 2022-08-17 PROCEDURE — G0009 ADMIN PNEUMOCOCCAL VACCINE: HCPCS | Mod: ,,, | Performed by: INTERNAL MEDICINE

## 2022-08-17 PROCEDURE — 90732 PPSV23 VACC 2 YRS+ SUBQ/IM: CPT | Mod: ,,, | Performed by: INTERNAL MEDICINE

## 2022-08-17 PROCEDURE — 80061 LIPID PANEL: ICD-10-PCS | Mod: ,,, | Performed by: CLINICAL MEDICAL LABORATORY

## 2022-08-17 PROCEDURE — G0009 PNEUMOCOCCAL POLYSACCHARIDE VACCINE 23-VALENT =>2YO SQ IM: ICD-10-PCS | Mod: ,,, | Performed by: INTERNAL MEDICINE

## 2022-08-17 PROCEDURE — 90732 PNEUMOCOCCAL POLYSACCHARIDE VACCINE 23-VALENT =>2YO SQ IM: ICD-10-PCS | Mod: ,,, | Performed by: INTERNAL MEDICINE

## 2022-08-17 PROCEDURE — 99214 PR OFFICE/OUTPT VISIT, EST, LEVL IV, 30-39 MIN: ICD-10-PCS | Mod: ,,, | Performed by: INTERNAL MEDICINE

## 2022-08-17 PROCEDURE — 80061 LIPID PANEL: CPT | Mod: ,,, | Performed by: CLINICAL MEDICAL LABORATORY

## 2022-08-17 PROCEDURE — 99214 OFFICE O/P EST MOD 30 MIN: CPT | Mod: ,,, | Performed by: INTERNAL MEDICINE

## 2022-08-17 RX ORDER — GABAPENTIN 100 MG/1
100 CAPSULE ORAL NIGHTLY
Qty: 30 CAPSULE | Refills: 2 | Status: SHIPPED | OUTPATIENT
Start: 2022-08-17 | End: 2022-12-01 | Stop reason: SDUPTHER

## 2022-08-17 RX ORDER — BICALUTAMIDE 50 MG/1
50 TABLET, FILM COATED ORAL DAILY
Qty: 90 TABLET | Refills: 1 | Status: SHIPPED | OUTPATIENT
Start: 2022-08-17 | End: 2024-01-18 | Stop reason: SDUPTHER

## 2022-08-17 NOTE — PROGRESS NOTES
Subjective:       Patient ID: Khoa Phan is a 89 y.o. male.    Chief Complaint: Hypertension, Hand Problem (Tingling/itch), and Medication Refill    Patient is here today for check up evaluation. Patient complaining of tingling and itching of the left hand. States has been ongoing for 1 week but does not bother him but every now and then. Denies numbness. Will prescribe Neurontin 100 mg PO QHS #30 RF2. Will follow in 2 weeks.    Care Gaps discussed and patient agrees to the following:  Lipid panel to be done today  Pneumonia vaccine  All others refused.      Current Medications:    Current Outpatient Medications:     bicalutamide (CASODEX) 50 MG Tab, Take 1 tablet (50 mg total) by mouth once daily., Disp: 90 tablet, Rfl: 1    hydroCHLOROthiazide (HYDRODIURIL) 25 MG tablet, Take 1 tablet (25 mg total) by mouth once daily., Disp: 90 tablet, Rfl: 1    lisinopriL (PRINIVIL,ZESTRIL) 20 MG tablet, Take 1 tablet (20 mg total) by mouth every morning., Disp: 90 tablet, Rfl: 1    terazosin (HYTRIN) 2 MG capsule, Take 1 capsule (2 mg total) by mouth every evening., Disp: 90 capsule, Rfl: 1    timolol maleate 0.5% (TIMOPTIC) 0.5 % Drop, INSTILL 1 DROP INTO BOTH EYES IN THE MORNING, Disp: , Rfl:     travoprost (TRAVATAN Z) 0.004 % ophthalmic solution, Place 1 drop into both eyes nightly., Disp: , Rfl:     Last Labs:     No visits with results within 1 Month(s) from this visit.   Latest known visit with results is:   Office Visit on 05/17/2022   Component Date Value    Sodium 05/17/2022 137     Potassium 05/17/2022 4.0     Chloride 05/17/2022 106     CO2 05/17/2022 24     Anion Gap 05/17/2022 11     Glucose 05/17/2022 86     BUN 05/17/2022 38 (A)    Creatinine 05/17/2022 2.46 (A)    BUN/Creatinine Ratio 05/17/2022 15     Calcium 05/17/2022 8.8     eGFR 05/17/2022 26 (A)       Last Imaging:  X-Ray Hip 2 or 3 views Right (with Pelvis when performed)  Narrative: EXAMINATION:  XR HIP WITH PELVIS WHEN  PERFORMED, 2 OR 3  VIEWS RIGHT    CLINICAL HISTORY:  pain;    FINDINGS:  There is diffuse demineralization.  There is mild osteophyte and superior joint space loss in the right hip.  There are vascular calcifications present.  No acute right hip fracture is seen.  Impression: Demineralization and mild degenerative changes    Electronically signed by: Dee Fletcher  Date:    05/20/2022  Time:    10:58         Review of Systems   All other systems reviewed and are negative.        Objective:      Physical Exam  Vitals reviewed.   Constitutional:       Appearance: Normal appearance.   Cardiovascular:      Rate and Rhythm: Normal rate and regular rhythm.      Pulses: Normal pulses.      Heart sounds: Normal heart sounds.   Pulmonary:      Effort: Pulmonary effort is normal.      Breath sounds: Normal breath sounds.   Abdominal:      General: Abdomen is flat. Bowel sounds are normal.      Palpations: Abdomen is soft.   Musculoskeletal:         General: Normal range of motion.      Cervical back: Normal range of motion and neck supple.   Skin:     General: Skin is warm and dry.   Neurological:      General: No focal deficit present.      Mental Status: He is alert and oriented to person, place, and time. Mental status is at baseline.         Assessment:       1. Stage 3 chronic kidney disease, unspecified whether stage 3a or 3b CKD  Lipid Panel    Lipid Panel   2. Neuropathy of left hand     3. Abnormal finding of blood chemistry, unspecified   Lipid Panel    Lipid Panel        Plan:         Khoa was seen today for hypertension, hand problem and medication refill.    Diagnoses and all orders for this visit:    Stage 3 chronic kidney disease, unspecified whether stage 3a or 3b CKD  -     Lipid Panel; Future  -     Lipid Panel    Neuropathy of left hand    Abnormal finding of blood chemistry, unspecified   -     Lipid Panel; Future  -     Lipid Panel    Other orders  -     (In Office Administered) Pneumococcal Polysaccharide  Vaccine (23 Valent) (SQ/IM)  The following orders have not been finalized:  -     bicalutamide (CASODEX) 50 MG Tab  -     gabapentin (NEURONTIN) 100 MG capsule

## 2022-08-17 NOTE — PATIENT INSTRUCTIONS
Khoa was seen today for hypertension, hand problem and medication refill.    Diagnoses and all orders for this visit:    Stage 3 chronic kidney disease, unspecified whether stage 3a or 3b CKD  -     Lipid Panel; Future  -     Lipid Panel    Neuropathy of left hand    Abnormal finding of blood chemistry, unspecified   -     Lipid Panel; Future  -     Lipid Panel    Other orders  -     (In Office Administered) Pneumococcal Polysaccharide Vaccine (23 Valent) (SQ/IM)  The following orders have not been finalized:  -     bicalutamide (CASODEX) 50 MG Tab  -     gabapentin (NEURONTIN) 100 MG capsule

## 2022-08-25 ENCOUNTER — TELEPHONE (OUTPATIENT)
Dept: FAMILY MEDICINE | Facility: CLINIC | Age: 87
End: 2022-08-25
Payer: MEDICARE

## 2022-08-25 NOTE — TELEPHONE ENCOUNTER
----- Message from Rosi Hernandez sent at 8/25/2022 10:30 AM CDT -----  Patient's child, Shruti is calling because  is thinking he was recommended to another doctor, and they are trying to figure out why.    Patient call back - [phone]        Called patient, no answer, not able to leave message.  Called number above, is patient's son Shruti (Narayan). He says his father received a letter from an internal medicine clinic with he address [address] He says the doctor's name was Kye Freeman. Son inquired if his father had been recommended to see another doctor. Informed Shruti the last note has patient to come back in 2 weeks to see Dr. Stafford. Voiced understanding.

## 2022-08-31 ENCOUNTER — OFFICE VISIT (OUTPATIENT)
Dept: FAMILY MEDICINE | Facility: CLINIC | Age: 87
End: 2022-08-31
Payer: MEDICARE

## 2022-08-31 ENCOUNTER — EXTERNAL CHRONIC CARE MANAGEMENT (OUTPATIENT)
Dept: FAMILY MEDICINE | Facility: CLINIC | Age: 87
End: 2022-08-31
Payer: MEDICARE

## 2022-08-31 VITALS
BODY MASS INDEX: 22.3 KG/M2 | HEIGHT: 70 IN | OXYGEN SATURATION: 98 % | WEIGHT: 155.81 LBS | HEART RATE: 86 BPM | RESPIRATION RATE: 16 BRPM | DIASTOLIC BLOOD PRESSURE: 66 MMHG | TEMPERATURE: 98 F | SYSTOLIC BLOOD PRESSURE: 130 MMHG

## 2022-08-31 DIAGNOSIS — G62.9 NEUROPATHY: Primary | ICD-10-CM

## 2022-08-31 PROCEDURE — G0511 PR CHRONIC CARE MGMT, RHC OR FQHC ONLY, 20 MINS OR MORE: ICD-10-PCS | Mod: ,,, | Performed by: INTERNAL MEDICINE

## 2022-08-31 PROCEDURE — G0511 CCM/BHI BY RHC/FQHC 20MIN MO: HCPCS | Mod: ,,, | Performed by: INTERNAL MEDICINE

## 2022-08-31 PROCEDURE — 99212 OFFICE O/P EST SF 10 MIN: CPT | Mod: ,,, | Performed by: INTERNAL MEDICINE

## 2022-08-31 PROCEDURE — 99212 PR OFFICE/OUTPT VISIT, EST, LEVL II, 10-19 MIN: ICD-10-PCS | Mod: ,,, | Performed by: INTERNAL MEDICINE

## 2022-08-31 NOTE — PROGRESS NOTES
Subjective:       Patient ID: Khoa Phan is a 89 y.o. male.    Chief Complaint: Hand Problem (Tingling and itching) and Follow-up    Patient is here today for a follow up evaluation. Patient blood pressure 130/66 today on intake. Patient has no new complaints. Patient states Neurontin did help with tingling of his hands. Will follow in 3 months.     Current Medications:    Current Outpatient Medications:     bicalutamide (CASODEX) 50 MG Tab, Take 1 tablet (50 mg total) by mouth once daily., Disp: 90 tablet, Rfl: 1    gabapentin (NEURONTIN) 100 MG capsule, Take 1 capsule (100 mg total) by mouth every evening., Disp: 30 capsule, Rfl: 2    hydroCHLOROthiazide (HYDRODIURIL) 25 MG tablet, Take 1 tablet (25 mg total) by mouth once daily., Disp: 90 tablet, Rfl: 1    lisinopriL (PRINIVIL,ZESTRIL) 20 MG tablet, Take 1 tablet (20 mg total) by mouth every morning., Disp: 90 tablet, Rfl: 1    terazosin (HYTRIN) 2 MG capsule, Take 1 capsule (2 mg total) by mouth every evening., Disp: 90 capsule, Rfl: 1    timolol maleate 0.5% (TIMOPTIC) 0.5 % Drop, INSTILL 1 DROP INTO BOTH EYES IN THE MORNING, Disp: , Rfl:     travoprost (TRAVATAN Z) 0.004 % ophthalmic solution, Place 1 drop into both eyes nightly., Disp: , Rfl:     Last Labs:     Office Visit on 08/17/2022   Component Date Value    Triglycerides 08/17/2022 79     Cholesterol 08/17/2022 216 (H)     HDL Cholesterol 08/17/2022 66 (H)     Cholesterol/HDL Ratio (R* 08/17/2022 3.3     Non-HDL 08/17/2022 150     LDL Calculated 08/17/2022 134     LDL/HDL 08/17/2022 2.0     VLDL 08/17/2022 16        Last Imaging:  X-Ray Hip 2 or 3 views Right (with Pelvis when performed)  Narrative: EXAMINATION:  XR HIP WITH PELVIS WHEN PERFORMED, 2 OR 3  VIEWS RIGHT    CLINICAL HISTORY:  pain;    FINDINGS:  There is diffuse demineralization.  There is mild osteophyte and superior joint space loss in the right hip.  There are vascular calcifications present.  No acute right hip fracture is  seen.  Impression: Demineralization and mild degenerative changes    Electronically signed by: Dee Fletcher  Date:    05/20/2022  Time:    10:58         Review of Systems   All other systems reviewed and are negative.      Objective:      Physical Exam  Constitutional:       Appearance: Normal appearance. He is normal weight.   Cardiovascular:      Rate and Rhythm: Normal rate and regular rhythm.      Pulses: Normal pulses.      Heart sounds: Normal heart sounds.   Pulmonary:      Effort: Pulmonary effort is normal.      Breath sounds: Normal breath sounds.   Abdominal:      General: Abdomen is flat. Bowel sounds are normal.      Palpations: Abdomen is soft.   Musculoskeletal:         General: Normal range of motion.      Cervical back: Normal range of motion and neck supple.   Skin:     General: Skin is warm and dry.   Neurological:      General: No focal deficit present.      Mental Status: He is alert and oriented to person, place, and time. Mental status is at baseline.       Assessment:       1. Neuropathy             Plan:         Khoa was seen today for hand problem and follow-up.    Diagnoses and all orders for this visit:    Neuropathy

## 2022-08-31 NOTE — PATIENT INSTRUCTIONS
Khoa was seen today for hand problem and follow-up.    Diagnoses and all orders for this visit:    Neuropathy

## 2022-10-31 ENCOUNTER — EXTERNAL CHRONIC CARE MANAGEMENT (OUTPATIENT)
Dept: FAMILY MEDICINE | Facility: CLINIC | Age: 87
End: 2022-10-31
Payer: MEDICARE

## 2022-10-31 PROCEDURE — G0511 PR CHRONIC CARE MGMT, RHC OR FQHC ONLY, 20 MINS OR MORE: ICD-10-PCS | Mod: ,,, | Performed by: INTERNAL MEDICINE

## 2022-10-31 PROCEDURE — G0511 CCM/BHI BY RHC/FQHC 20MIN MO: HCPCS | Mod: ,,, | Performed by: INTERNAL MEDICINE

## 2022-11-30 ENCOUNTER — EXTERNAL CHRONIC CARE MANAGEMENT (OUTPATIENT)
Dept: FAMILY MEDICINE | Facility: CLINIC | Age: 87
End: 2022-11-30
Payer: MEDICARE

## 2022-11-30 PROCEDURE — G0511 CCM/BHI BY RHC/FQHC 20MIN MO: HCPCS | Mod: ,,, | Performed by: INTERNAL MEDICINE

## 2022-11-30 PROCEDURE — G0511 PR CHRONIC CARE MGMT, RHC OR FQHC ONLY, 20 MINS OR MORE: ICD-10-PCS | Mod: ,,, | Performed by: INTERNAL MEDICINE

## 2022-12-01 ENCOUNTER — OFFICE VISIT (OUTPATIENT)
Dept: FAMILY MEDICINE | Facility: CLINIC | Age: 87
End: 2022-12-01
Payer: MEDICARE

## 2022-12-01 VITALS
WEIGHT: 157.38 LBS | RESPIRATION RATE: 16 BRPM | BODY MASS INDEX: 23.31 KG/M2 | SYSTOLIC BLOOD PRESSURE: 112 MMHG | HEART RATE: 80 BPM | TEMPERATURE: 98 F | HEIGHT: 69 IN | OXYGEN SATURATION: 97 % | DIASTOLIC BLOOD PRESSURE: 70 MMHG

## 2022-12-01 DIAGNOSIS — I10 ESSENTIAL HYPERTENSION: Primary | ICD-10-CM

## 2022-12-01 DIAGNOSIS — G62.9 NEUROPATHY: ICD-10-CM

## 2022-12-01 DIAGNOSIS — N40.0 BENIGN PROSTATIC HYPERPLASIA, UNSPECIFIED WHETHER LOWER URINARY TRACT SYMPTOMS PRESENT: ICD-10-CM

## 2022-12-01 PROCEDURE — 99214 PR OFFICE/OUTPT VISIT, EST, LEVL IV, 30-39 MIN: ICD-10-PCS | Mod: ,,, | Performed by: INTERNAL MEDICINE

## 2022-12-01 PROCEDURE — 99214 OFFICE O/P EST MOD 30 MIN: CPT | Mod: ,,, | Performed by: INTERNAL MEDICINE

## 2022-12-01 RX ORDER — TERAZOSIN 2 MG/1
2 CAPSULE ORAL NIGHTLY
Qty: 90 CAPSULE | Refills: 1 | Status: SHIPPED | OUTPATIENT
Start: 2022-12-01 | End: 2023-03-01 | Stop reason: SDUPTHER

## 2022-12-01 RX ORDER — GABAPENTIN 100 MG/1
100 CAPSULE ORAL NIGHTLY
Qty: 30 CAPSULE | Refills: 2 | Status: SHIPPED | OUTPATIENT
Start: 2022-12-01 | End: 2023-03-01 | Stop reason: SDUPTHER

## 2022-12-01 RX ORDER — LISINOPRIL 20 MG/1
20 TABLET ORAL EVERY MORNING
Qty: 90 TABLET | Refills: 1 | Status: SHIPPED | OUTPATIENT
Start: 2022-12-01 | End: 2023-03-01 | Stop reason: SDUPTHER

## 2022-12-01 NOTE — PATIENT INSTRUCTIONS
Khoa was seen today for medication refill.    Diagnoses and all orders for this visit:    Essential hypertension    Neuropathy    Benign prostatic hyperplasia, unspecified whether lower urinary tract symptoms present    Other orders  The following orders have not been finalized:  -     gabapentin (NEURONTIN) 100 MG capsule  -     lisinopriL (PRINIVIL,ZESTRIL) 20 MG tablet  -     terazosin (HYTRIN) 2 MG capsule

## 2022-12-01 NOTE — PROGRESS NOTES
Subjective:       Patient ID: Khoa Phan is a 89 y.o. male.    Chief Complaint: Medication Refill    Patient is here today for check up evaluation. Patient states doing well and has no new complaints. Requesting medication refills. Will follow in 3 months.     Medication Refill    Current Medications:    Current Outpatient Medications:     bicalutamide (CASODEX) 50 MG Tab, Take 1 tablet (50 mg total) by mouth once daily., Disp: 90 tablet, Rfl: 1    gabapentin (NEURONTIN) 100 MG capsule, Take 1 capsule (100 mg total) by mouth every evening., Disp: 30 capsule, Rfl: 2    hydroCHLOROthiazide (HYDRODIURIL) 25 MG tablet, Take 1 tablet (25 mg total) by mouth once daily., Disp: 90 tablet, Rfl: 1    lisinopriL (PRINIVIL,ZESTRIL) 20 MG tablet, TAKE 1 TABLET(20 MG) BY MOUTH EVERY MORNING, Disp: 90 tablet, Rfl: 1    terazosin (HYTRIN) 2 MG capsule, Take 1 capsule (2 mg total) by mouth every evening., Disp: 90 capsule, Rfl: 1    timolol maleate 0.5% (TIMOPTIC) 0.5 % Drop, INSTILL 1 DROP INTO BOTH EYES IN THE MORNING, Disp: , Rfl:     travoprost (TRAVATAN Z) 0.004 % ophthalmic solution, Place 1 drop into both eyes nightly., Disp: , Rfl:     Last Labs:     No visits with results within 1 Month(s) from this visit.   Latest known visit with results is:   Office Visit on 08/17/2022   Component Date Value    Triglycerides 08/17/2022 79     Cholesterol 08/17/2022 216 (H)     HDL Cholesterol 08/17/2022 66 (H)     Cholesterol/HDL Ratio (R* 08/17/2022 3.3     Non-HDL 08/17/2022 150     LDL Calculated 08/17/2022 134     LDL/HDL 08/17/2022 2.0     VLDL 08/17/2022 16        Last Imaging:  X-Ray Hip 2 or 3 views Right (with Pelvis when performed)  Narrative: EXAMINATION:  XR HIP WITH PELVIS WHEN PERFORMED, 2 OR 3  VIEWS RIGHT    CLINICAL HISTORY:  pain;    FINDINGS:  There is diffuse demineralization.  There is mild osteophyte and superior joint space loss in the right hip.  There are vascular calcifications present.  No acute right  hip fracture is seen.  Impression: Demineralization and mild degenerative changes    Electronically signed by: Dee Fletcher  Date:    05/20/2022  Time:    10:58         Review of Systems   All other systems reviewed and are negative.      Objective:      Physical Exam  Vitals reviewed.   Constitutional:       Appearance: Normal appearance.   Cardiovascular:      Rate and Rhythm: Normal rate and regular rhythm.      Pulses: Normal pulses.      Heart sounds: Normal heart sounds.   Pulmonary:      Effort: Pulmonary effort is normal.      Breath sounds: Normal breath sounds.   Abdominal:      General: Abdomen is flat. Bowel sounds are normal.      Palpations: Abdomen is soft.   Musculoskeletal:         General: Normal range of motion.      Cervical back: Normal range of motion and neck supple.   Skin:     General: Skin is warm and dry.   Neurological:      General: No focal deficit present.      Mental Status: He is alert and oriented to person, place, and time. Mental status is at baseline.       Assessment:       1. Essential hypertension        2. Neuropathy        3. Benign prostatic hyperplasia, unspecified whether lower urinary tract symptoms present             Plan:         Khoa was seen today for medication refill.    Diagnoses and all orders for this visit:    Essential hypertension    Neuropathy    Benign prostatic hyperplasia, unspecified whether lower urinary tract symptoms present    Other orders  The following orders have not been finalized:  -     gabapentin (NEURONTIN) 100 MG capsule  -     lisinopriL (PRINIVIL,ZESTRIL) 20 MG tablet  -     terazosin (HYTRIN) 2 MG capsule

## 2022-12-31 ENCOUNTER — EXTERNAL CHRONIC CARE MANAGEMENT (OUTPATIENT)
Dept: FAMILY MEDICINE | Facility: CLINIC | Age: 87
End: 2022-12-31
Payer: MEDICARE

## 2022-12-31 PROCEDURE — G0511 PR CHRONIC CARE MGMT, RHC OR FQHC ONLY, 20 MINS OR MORE: ICD-10-PCS | Mod: ,,, | Performed by: INTERNAL MEDICINE

## 2022-12-31 PROCEDURE — G0511 CCM/BHI BY RHC/FQHC 20MIN MO: HCPCS | Mod: ,,, | Performed by: INTERNAL MEDICINE

## 2023-01-31 ENCOUNTER — EXTERNAL CHRONIC CARE MANAGEMENT (OUTPATIENT)
Dept: FAMILY MEDICINE | Facility: CLINIC | Age: 88
End: 2023-01-31
Payer: MEDICARE

## 2023-01-31 PROCEDURE — G0511 PR CHRONIC CARE MGMT, RHC OR FQHC ONLY, 20 MINS OR MORE: ICD-10-PCS | Mod: ,,, | Performed by: INTERNAL MEDICINE

## 2023-01-31 PROCEDURE — G0511 CCM/BHI BY RHC/FQHC 20MIN MO: HCPCS | Mod: ,,, | Performed by: INTERNAL MEDICINE

## 2023-02-28 ENCOUNTER — EXTERNAL CHRONIC CARE MANAGEMENT (OUTPATIENT)
Dept: FAMILY MEDICINE | Facility: CLINIC | Age: 88
End: 2023-02-28
Payer: MEDICARE

## 2023-02-28 PROCEDURE — G0511 CCM/BHI BY RHC/FQHC 20MIN MO: HCPCS | Mod: ,,, | Performed by: INTERNAL MEDICINE

## 2023-02-28 PROCEDURE — G0511 PR CHRONIC CARE MGMT, RHC OR FQHC ONLY, 20 MINS OR MORE: ICD-10-PCS | Mod: ,,, | Performed by: INTERNAL MEDICINE

## 2023-03-01 ENCOUNTER — OFFICE VISIT (OUTPATIENT)
Dept: FAMILY MEDICINE | Facility: CLINIC | Age: 88
End: 2023-03-01
Payer: MEDICARE

## 2023-03-01 VITALS
OXYGEN SATURATION: 99 % | WEIGHT: 151.63 LBS | TEMPERATURE: 98 F | HEIGHT: 69 IN | DIASTOLIC BLOOD PRESSURE: 75 MMHG | HEART RATE: 68 BPM | SYSTOLIC BLOOD PRESSURE: 129 MMHG | RESPIRATION RATE: 17 BRPM | BODY MASS INDEX: 22.46 KG/M2

## 2023-03-01 DIAGNOSIS — I10 PRIMARY HYPERTENSION: Primary | ICD-10-CM

## 2023-03-01 DIAGNOSIS — G62.9 NEUROPATHY: ICD-10-CM

## 2023-03-01 DIAGNOSIS — N40.0 BENIGN PROSTATIC HYPERPLASIA, UNSPECIFIED WHETHER LOWER URINARY TRACT SYMPTOMS PRESENT: ICD-10-CM

## 2023-03-01 PROCEDURE — 99214 PR OFFICE/OUTPT VISIT, EST, LEVL IV, 30-39 MIN: ICD-10-PCS | Mod: ,,, | Performed by: INTERNAL MEDICINE

## 2023-03-01 PROCEDURE — 99214 OFFICE O/P EST MOD 30 MIN: CPT | Mod: ,,, | Performed by: INTERNAL MEDICINE

## 2023-03-01 RX ORDER — GABAPENTIN 100 MG/1
100 CAPSULE ORAL NIGHTLY
Qty: 30 CAPSULE | Refills: 2 | Status: SHIPPED | OUTPATIENT
Start: 2023-03-01 | End: 2023-04-20 | Stop reason: SDUPTHER

## 2023-03-01 RX ORDER — TERAZOSIN 2 MG/1
2 CAPSULE ORAL NIGHTLY
Qty: 90 CAPSULE | Refills: 1 | Status: SHIPPED | OUTPATIENT
Start: 2023-03-01 | End: 2023-06-05 | Stop reason: SDUPTHER

## 2023-03-01 RX ORDER — LISINOPRIL 20 MG/1
20 TABLET ORAL EVERY MORNING
Qty: 90 TABLET | Refills: 1 | Status: SHIPPED | OUTPATIENT
Start: 2023-03-01 | End: 2023-06-05 | Stop reason: SDUPTHER

## 2023-03-01 RX ORDER — HYDROCHLOROTHIAZIDE 25 MG/1
25 TABLET ORAL DAILY
Qty: 90 TABLET | Refills: 1 | Status: SHIPPED | OUTPATIENT
Start: 2023-03-01 | End: 2023-06-05 | Stop reason: SDUPTHER

## 2023-03-02 NOTE — PROGRESS NOTES
"Subjective:       Patient ID: Khoa Phan is a 89 y.o. male.    Chief Complaint: Follow-up    Patient is here today for a follow up evaluation. Patient blood pressure is stable today on intake, 129/75. Patient has no new complaints. Patient is requesting medication refills. Will refill today. Will follow with patient in 3 months.   .    Current Medications:    Current Outpatient Medications:     bicalutamide (CASODEX) 50 MG Tab, Take 1 tablet (50 mg total) by mouth once daily., Disp: 90 tablet, Rfl: 1    timolol maleate 0.5% (TIMOPTIC) 0.5 % Drop, INSTILL 1 DROP INTO BOTH EYES IN THE MORNING, Disp: , Rfl:     travoprost (TRAVATAN Z) 0.004 % ophthalmic solution, Place 1 drop into both eyes nightly., Disp: , Rfl:     gabapentin (NEURONTIN) 100 MG capsule, Take 1 capsule (100 mg total) by mouth every evening., Disp: 30 capsule, Rfl: 2    hydroCHLOROthiazide (HYDRODIURIL) 25 MG tablet, Take 1 tablet (25 mg total) by mouth once daily., Disp: 90 tablet, Rfl: 1    lisinopriL (PRINIVIL,ZESTRIL) 20 MG tablet, Take 1 tablet (20 mg total) by mouth every morning., Disp: 90 tablet, Rfl: 1    terazosin (HYTRIN) 2 MG capsule, Take 1 capsule (2 mg total) by mouth every evening., Disp: 90 capsule, Rfl: 1           Review of Systems   All other systems reviewed and are negative.             Vitals:    03/01/23 0916   BP: 129/75   BP Location: Right arm   Patient Position: Sitting   BP Method: Large (Automatic)   Pulse: 68   Resp: 17   Temp: 97.8 °F (36.6 °C)   TempSrc: Temporal   SpO2: 99%   Weight: 68.8 kg (151 lb 9.6 oz)   Height: 5' 9" (1.753 m)        Physical Exam  Constitutional:       Appearance: Normal appearance. He is normal weight.   Cardiovascular:      Rate and Rhythm: Normal rate and regular rhythm.      Pulses: Normal pulses.      Heart sounds: Normal heart sounds.   Pulmonary:      Effort: Pulmonary effort is normal.      Breath sounds: Normal breath sounds.   Abdominal:      General: Abdomen is flat. Bowel " sounds are normal.      Palpations: Abdomen is soft.   Musculoskeletal:         General: Normal range of motion.      Cervical back: Normal range of motion and neck supple.   Skin:     General: Skin is warm and dry.   Neurological:      General: No focal deficit present.      Mental Status: He is alert and oriented to person, place, and time. Mental status is at baseline.         Last Labs:     No visits with results within 1 Month(s) from this visit.   Latest known visit with results is:   Office Visit on 08/17/2022   Component Date Value    Triglycerides 08/17/2022 79     Cholesterol 08/17/2022 216 (H)     HDL Cholesterol 08/17/2022 66 (H)     Cholesterol/HDL Ratio (R* 08/17/2022 3.3     Non-HDL 08/17/2022 150     LDL Calculated 08/17/2022 134     LDL/HDL 08/17/2022 2.0     VLDL 08/17/2022 16        Last Imaging:  X-Ray Hip 2 or 3 views Right (with Pelvis when performed)  Narrative: EXAMINATION:  XR HIP WITH PELVIS WHEN PERFORMED, 2 OR 3  VIEWS RIGHT    CLINICAL HISTORY:  pain;    FINDINGS:  There is diffuse demineralization.  There is mild osteophyte and superior joint space loss in the right hip.  There are vascular calcifications present.  No acute right hip fracture is seen.  Impression: Demineralization and mild degenerative changes    Electronically signed by: Dee Fletcher  Date:    05/20/2022  Time:    10:58         **Labs and x-rays personally reviewed by me    ** reviewed      Objective:        Assessment:       1. Primary hypertension        2. Benign prostatic hyperplasia, unspecified whether lower urinary tract symptoms present        3. Neuropathy             Plan:         [unfilled]

## 2023-03-02 NOTE — PATIENT INSTRUCTIONS
Khoa was seen today for follow-up.    Diagnoses and all orders for this visit:    Primary hypertension    Benign prostatic hyperplasia, unspecified whether lower urinary tract symptoms present    Neuropathy    Other orders  -     gabapentin (NEURONTIN) 100 MG capsule; Take 1 capsule (100 mg total) by mouth every evening.  -     hydroCHLOROthiazide (HYDRODIURIL) 25 MG tablet; Take 1 tablet (25 mg total) by mouth once daily.  -     lisinopriL (PRINIVIL,ZESTRIL) 20 MG tablet; Take 1 tablet (20 mg total) by mouth every morning.  -     terazosin (HYTRIN) 2 MG capsule; Take 1 capsule (2 mg total) by mouth every evening.

## 2023-03-31 ENCOUNTER — EXTERNAL CHRONIC CARE MANAGEMENT (OUTPATIENT)
Dept: FAMILY MEDICINE | Facility: CLINIC | Age: 88
End: 2023-03-31
Payer: MEDICARE

## 2023-03-31 PROCEDURE — G0511 PR CHRONIC CARE MGMT, RHC OR FQHC ONLY, 20 MINS OR MORE: ICD-10-PCS | Mod: ,,, | Performed by: INTERNAL MEDICINE

## 2023-03-31 PROCEDURE — G0511 CCM/BHI BY RHC/FQHC 20MIN MO: HCPCS | Mod: ,,, | Performed by: INTERNAL MEDICINE

## 2023-04-20 RX ORDER — GABAPENTIN 100 MG/1
100 CAPSULE ORAL NIGHTLY
Qty: 30 CAPSULE | Refills: 2 | Status: SHIPPED | OUTPATIENT
Start: 2023-04-20 | End: 2023-06-05 | Stop reason: SDUPTHER

## 2023-04-20 NOTE — TELEPHONE ENCOUNTER
----- Message from Kimberly Iglesias sent at 4/19/2023  2:09 PM CDT -----  PT CALLED FOR REFILL    gabapentin (NEURONTIN) 100 MG capsule        [id number] - [place], [place] - [address] AT [facility] [address]        [phone]

## 2023-04-30 ENCOUNTER — EXTERNAL CHRONIC CARE MANAGEMENT (OUTPATIENT)
Dept: FAMILY MEDICINE | Facility: CLINIC | Age: 88
End: 2023-04-30
Payer: MEDICARE

## 2023-04-30 PROCEDURE — G0511 CCM/BHI BY RHC/FQHC 20MIN MO: HCPCS | Mod: ,,, | Performed by: INTERNAL MEDICINE

## 2023-04-30 PROCEDURE — G0511 PR CHRONIC CARE MGMT, RHC OR FQHC ONLY, 20 MINS OR MORE: ICD-10-PCS | Mod: ,,, | Performed by: INTERNAL MEDICINE

## 2023-05-31 ENCOUNTER — EXTERNAL CHRONIC CARE MANAGEMENT (OUTPATIENT)
Dept: FAMILY MEDICINE | Facility: CLINIC | Age: 88
End: 2023-05-31
Payer: MEDICARE

## 2023-05-31 PROCEDURE — G0511 CCM/BHI BY RHC/FQHC 20MIN MO: HCPCS | Mod: ,,, | Performed by: INTERNAL MEDICINE

## 2023-05-31 PROCEDURE — G0511 PR CHRONIC CARE MGMT, RHC OR FQHC ONLY, 20 MINS OR MORE: ICD-10-PCS | Mod: ,,, | Performed by: INTERNAL MEDICINE

## 2023-06-05 ENCOUNTER — OFFICE VISIT (OUTPATIENT)
Dept: FAMILY MEDICINE | Facility: CLINIC | Age: 88
End: 2023-06-05
Payer: MEDICARE

## 2023-06-05 VITALS
HEIGHT: 69 IN | RESPIRATION RATE: 17 BRPM | OXYGEN SATURATION: 98 % | WEIGHT: 150.63 LBS | SYSTOLIC BLOOD PRESSURE: 155 MMHG | BODY MASS INDEX: 22.31 KG/M2 | DIASTOLIC BLOOD PRESSURE: 96 MMHG | TEMPERATURE: 97 F | HEART RATE: 80 BPM

## 2023-06-05 DIAGNOSIS — I10 ESSENTIAL (PRIMARY) HYPERTENSION: Primary | ICD-10-CM

## 2023-06-05 DIAGNOSIS — N40.0 BENIGN PROSTATIC HYPERPLASIA, UNSPECIFIED WHETHER LOWER URINARY TRACT SYMPTOMS PRESENT: ICD-10-CM

## 2023-06-05 PROCEDURE — 99214 PR OFFICE/OUTPT VISIT, EST, LEVL IV, 30-39 MIN: ICD-10-PCS | Mod: ,,, | Performed by: INTERNAL MEDICINE

## 2023-06-05 PROCEDURE — 99214 OFFICE O/P EST MOD 30 MIN: CPT | Mod: ,,, | Performed by: INTERNAL MEDICINE

## 2023-06-05 RX ORDER — TERAZOSIN 2 MG/1
2 CAPSULE ORAL NIGHTLY
Qty: 90 CAPSULE | Refills: 1 | Status: SHIPPED | OUTPATIENT
Start: 2023-06-05 | End: 2023-09-11 | Stop reason: SDUPTHER

## 2023-06-05 RX ORDER — LISINOPRIL 20 MG/1
20 TABLET ORAL EVERY MORNING
Qty: 90 TABLET | Refills: 1 | Status: SHIPPED | OUTPATIENT
Start: 2023-06-05 | End: 2023-07-27 | Stop reason: SDUPTHER

## 2023-06-05 RX ORDER — GABAPENTIN 100 MG/1
100 CAPSULE ORAL NIGHTLY
Qty: 30 CAPSULE | Refills: 2 | Status: SHIPPED | OUTPATIENT
Start: 2023-06-05 | End: 2023-08-16 | Stop reason: SDUPTHER

## 2023-06-05 RX ORDER — HYDROCHLOROTHIAZIDE 25 MG/1
25 TABLET ORAL DAILY
Qty: 90 TABLET | Refills: 1 | Status: SHIPPED | OUTPATIENT
Start: 2023-06-05 | End: 2023-09-11 | Stop reason: SDUPTHER

## 2023-06-06 PROBLEM — N40.0 BENIGN PROSTATIC HYPERPLASIA: Status: ACTIVE | Noted: 2023-06-06

## 2023-06-06 PROBLEM — I10 ESSENTIAL (PRIMARY) HYPERTENSION: Status: ACTIVE | Noted: 2023-06-06

## 2023-06-06 NOTE — PROGRESS NOTES
No Subjective:       Patient ID: Khoa Phan is a 89 y.o. male.    Chief Complaint: Hypertension  89-year-old gentleman presents with hypertension which is chronic patient has chronic BPH.  Blood pressure is elevated 160/100 repeat is 155/96.  Patient stated his blood pressure is high because he ate fried chicken last night I will hold off on increasing his antihypertensive agents for now.  Medicines that will be refilled today will be gabapentin for his neuropathy hydrochlorothiazide, lisinopril, and Hytrin.  Hypertension  Pertinent negatives include no chest pain or shortness of breath.   .    Current Medications:    Current Outpatient Medications:     bicalutamide (CASODEX) 50 MG Tab, Take 1 tablet (50 mg total) by mouth once daily., Disp: 90 tablet, Rfl: 1    timolol maleate 0.5% (TIMOPTIC) 0.5 % Drop, INSTILL 1 DROP INTO BOTH EYES IN THE MORNING, Disp: , Rfl:     travoprost (TRAVATAN Z) 0.004 % ophthalmic solution, Place 1 drop into both eyes nightly., Disp: , Rfl:     gabapentin (NEURONTIN) 100 MG capsule, Take 1 capsule (100 mg total) by mouth every evening., Disp: 30 capsule, Rfl: 2    hydroCHLOROthiazide (HYDRODIURIL) 25 MG tablet, Take 1 tablet (25 mg total) by mouth once daily., Disp: 90 tablet, Rfl: 1    lisinopriL (PRINIVIL,ZESTRIL) 20 MG tablet, Take 1 tablet (20 mg total) by mouth every morning., Disp: 90 tablet, Rfl: 1    terazosin (HYTRIN) 2 MG capsule, Take 1 capsule (2 mg total) by mouth every evening., Disp: 90 capsule, Rfl: 1           Review of Systems   Constitutional:  Negative for appetite change and fatigue.   HENT:  Negative for nasal congestion and rhinorrhea.    Eyes:  Negative for redness and visual disturbance.   Respiratory:  Negative for cough and shortness of breath.    Cardiovascular:  Negative for chest pain and leg swelling.   Gastrointestinal:  Negative for abdominal pain, constipation and diarrhea.   Endocrine: Negative for polyuria.   Genitourinary:  Negative for difficulty  "urinating, dysuria and erectile dysfunction.   Musculoskeletal:  Negative for arthralgias and myalgias.   Integumentary:  Negative for rash and mole/lesion.   All other systems reviewed and are negative.             Vitals:    06/05/23 0926 06/05/23 0932   BP: (!) 166/100 (!) 155/96   BP Location: Right arm    Patient Position: Sitting    BP Method: Small (Manual)    Pulse: 80    Resp: 17    Temp: 97.4 °F (36.3 °C)    TempSrc: Temporal    SpO2: 98%    Weight: 68.3 kg (150 lb 9.6 oz)    Height: 5' 9" (1.753 m)         Physical Exam  Vitals and nursing note reviewed.   Constitutional:       Appearance: Normal appearance.   Cardiovascular:      Rate and Rhythm: Normal rate and regular rhythm.      Pulses: Normal pulses.      Heart sounds: Normal heart sounds.   Pulmonary:      Effort: Pulmonary effort is normal.      Breath sounds: Normal breath sounds.   Abdominal:      General: Abdomen is flat. Bowel sounds are normal.      Palpations: Abdomen is soft.   Musculoskeletal:         General: Normal range of motion.   Skin:     General: Skin is warm and dry.   Neurological:      General: No focal deficit present.      Mental Status: He is alert and oriented to person, place, and time. Mental status is at baseline.         Last Labs:     No visits with results within 1 Month(s) from this visit.   Latest known visit with results is:   Office Visit on 08/17/2022   Component Date Value    Triglycerides 08/17/2022 79     Cholesterol 08/17/2022 216 (H)     HDL Cholesterol 08/17/2022 66 (H)     Cholesterol/HDL Ratio (R* 08/17/2022 3.3     Non-HDL 08/17/2022 150     LDL Calculated 08/17/2022 134     LDL/HDL 08/17/2022 2.0     VLDL 08/17/2022 16        Last Imaging:  X-Ray Hip 2 or 3 views Right (with Pelvis when performed)  Narrative: EXAMINATION:  XR HIP WITH PELVIS WHEN PERFORMED, 2 OR 3  VIEWS RIGHT    CLINICAL HISTORY:  pain;    FINDINGS:  There is diffuse demineralization.  There is mild osteophyte and superior joint space " loss in the right hip.  There are vascular calcifications present.  No acute right hip fracture is seen.  Impression: Demineralization and mild degenerative changes    Electronically signed by: Dee Fletcher  Date:    05/20/2022  Time:    10:58         **Labs and x-rays personally reviewed by me    ** reviewed      Objective:        Assessment:       1. Essential (primary) hypertension        2. Benign prostatic hyperplasia, unspecified whether lower urinary tract symptoms present             Plan:         [unfilled]

## 2023-06-12 ENCOUNTER — OFFICE VISIT (OUTPATIENT)
Dept: FAMILY MEDICINE | Facility: CLINIC | Age: 88
End: 2023-06-12
Payer: MEDICARE

## 2023-06-12 VITALS
OXYGEN SATURATION: 99 % | WEIGHT: 148.81 LBS | HEART RATE: 76 BPM | RESPIRATION RATE: 18 BRPM | TEMPERATURE: 97 F | BODY MASS INDEX: 22.04 KG/M2 | DIASTOLIC BLOOD PRESSURE: 89 MMHG | SYSTOLIC BLOOD PRESSURE: 156 MMHG | HEIGHT: 69 IN

## 2023-06-12 DIAGNOSIS — I10 ESSENTIAL (PRIMARY) HYPERTENSION: Primary | ICD-10-CM

## 2023-06-12 PROCEDURE — 99212 OFFICE O/P EST SF 10 MIN: CPT | Mod: ,,, | Performed by: INTERNAL MEDICINE

## 2023-06-12 PROCEDURE — 99212 PR OFFICE/OUTPT VISIT, EST, LEVL II, 10-19 MIN: ICD-10-PCS | Mod: ,,, | Performed by: INTERNAL MEDICINE

## 2023-06-14 NOTE — PROGRESS NOTES
Subjective:       Patient ID: Khoa Phan is a 89 y.o. male.    Chief Complaint: Hypertension  89-year-old man with a history of hypertension this is a follow-up visit he is doing well he is in no acute distress blood pressure stabilized to 133/88.  For now will continue current medical management which will be hydrochlorothiazide 25 mg 1 p.o. q.day.  Hypertension  Pertinent negatives include no chest pain or shortness of breath.   .    Current Medications:    Current Outpatient Medications:     bicalutamide (CASODEX) 50 MG Tab, Take 1 tablet (50 mg total) by mouth once daily., Disp: 90 tablet, Rfl: 1    gabapentin (NEURONTIN) 100 MG capsule, Take 1 capsule (100 mg total) by mouth every evening., Disp: 30 capsule, Rfl: 2    hydroCHLOROthiazide (HYDRODIURIL) 25 MG tablet, Take 1 tablet (25 mg total) by mouth once daily., Disp: 90 tablet, Rfl: 1    lisinopriL (PRINIVIL,ZESTRIL) 20 MG tablet, Take 1 tablet (20 mg total) by mouth every morning., Disp: 90 tablet, Rfl: 1    terazosin (HYTRIN) 2 MG capsule, Take 1 capsule (2 mg total) by mouth every evening., Disp: 90 capsule, Rfl: 1    timolol maleate 0.5% (TIMOPTIC) 0.5 % Drop, INSTILL 1 DROP INTO BOTH EYES IN THE MORNING, Disp: , Rfl:     travoprost (TRAVATAN Z) 0.004 % ophthalmic solution, Place 1 drop into both eyes nightly., Disp: , Rfl:            Review of Systems   Constitutional:  Negative for appetite change and fatigue.   HENT:  Negative for nasal congestion and rhinorrhea.    Eyes:  Negative for redness and visual disturbance.   Respiratory:  Negative for cough and shortness of breath.    Cardiovascular:  Negative for chest pain and leg swelling.   Gastrointestinal:  Negative for abdominal pain, constipation and diarrhea.   Endocrine: Negative for polyuria.   Genitourinary:  Negative for difficulty urinating, dysuria and erectile dysfunction.   Musculoskeletal:  Negative for arthralgias and myalgias.   Integumentary:  Negative for rash and mole/lesion.  "  All other systems reviewed and are negative.             Vitals:    06/12/23 1126 06/12/23 1127   BP: (!) 153/88 (!) 156/89   BP Location: Right arm    Patient Position: Sitting    BP Method: Large (Automatic)    Pulse: 76    Resp: 18    Temp: 97.3 °F (36.3 °C)    TempSrc: Temporal    SpO2: 99%    Weight: 67.5 kg (148 lb 12.8 oz)    Height: 5' 9" (1.753 m)         Physical Exam  Vitals and nursing note reviewed.   Constitutional:       Appearance: Normal appearance.   Cardiovascular:      Rate and Rhythm: Normal rate and regular rhythm.      Pulses: Normal pulses.      Heart sounds: Normal heart sounds.   Pulmonary:      Effort: Pulmonary effort is normal.      Breath sounds: Normal breath sounds.   Abdominal:      General: Abdomen is flat. Bowel sounds are normal.      Palpations: Abdomen is soft.   Musculoskeletal:         General: Normal range of motion.   Skin:     General: Skin is warm and dry.   Neurological:      General: No focal deficit present.      Mental Status: He is alert and oriented to person, place, and time. Mental status is at baseline.         Last Labs:     No visits with results within 1 Month(s) from this visit.   Latest known visit with results is:   Office Visit on 08/17/2022   Component Date Value    Triglycerides 08/17/2022 79     Cholesterol 08/17/2022 216 (H)     HDL Cholesterol 08/17/2022 66 (H)     Cholesterol/HDL Ratio (R* 08/17/2022 3.3     Non-HDL 08/17/2022 150     LDL Calculated 08/17/2022 134     LDL/HDL 08/17/2022 2.0     VLDL 08/17/2022 16        Last Imaging:  X-Ray Hip 2 or 3 views Right (with Pelvis when performed)  Narrative: EXAMINATION:  XR HIP WITH PELVIS WHEN PERFORMED, 2 OR 3  VIEWS RIGHT    CLINICAL HISTORY:  pain;    FINDINGS:  There is diffuse demineralization.  There is mild osteophyte and superior joint space loss in the right hip.  There are vascular calcifications present.  No acute right hip fracture is seen.  Impression: Demineralization and mild " degenerative changes    Electronically signed by: Dee Fletcher  Date:    05/20/2022  Time:    10:58         **Labs and x-rays personally reviewed by me    ** reviewed      Objective:        Assessment:       1. Essential (primary) hypertension             Plan:         [unfilled]

## 2023-06-30 ENCOUNTER — EXTERNAL CHRONIC CARE MANAGEMENT (OUTPATIENT)
Dept: FAMILY MEDICINE | Facility: CLINIC | Age: 88
End: 2023-06-30
Payer: MEDICARE

## 2023-06-30 PROCEDURE — G0511 PR CHRONIC CARE MGMT, RHC OR FQHC ONLY, 20 MINS OR MORE: ICD-10-PCS | Mod: ,,, | Performed by: INTERNAL MEDICINE

## 2023-06-30 PROCEDURE — G0511 CCM/BHI BY RHC/FQHC 20MIN MO: HCPCS | Mod: ,,, | Performed by: INTERNAL MEDICINE

## 2023-07-27 RX ORDER — LISINOPRIL 20 MG/1
20 TABLET ORAL EVERY MORNING
Qty: 90 TABLET | Refills: 1 | Status: SHIPPED | OUTPATIENT
Start: 2023-07-27 | End: 2023-09-11 | Stop reason: SDUPTHER

## 2023-07-31 ENCOUNTER — EXTERNAL CHRONIC CARE MANAGEMENT (OUTPATIENT)
Dept: FAMILY MEDICINE | Facility: CLINIC | Age: 88
End: 2023-07-31
Payer: MEDICARE

## 2023-07-31 PROCEDURE — G0511 CCM/BHI BY RHC/FQHC 20MIN MO: HCPCS | Mod: ,,, | Performed by: INTERNAL MEDICINE

## 2023-07-31 PROCEDURE — G0511 PR CHRONIC CARE MGMT, RHC OR FQHC ONLY, 20 MINS OR MORE: ICD-10-PCS | Mod: ,,, | Performed by: INTERNAL MEDICINE

## 2023-08-16 RX ORDER — GABAPENTIN 100 MG/1
100 CAPSULE ORAL NIGHTLY
Qty: 30 CAPSULE | Refills: 2 | Status: SHIPPED | OUTPATIENT
Start: 2023-08-16 | End: 2023-09-11 | Stop reason: SDUPTHER

## 2023-08-31 ENCOUNTER — EXTERNAL CHRONIC CARE MANAGEMENT (OUTPATIENT)
Dept: FAMILY MEDICINE | Facility: CLINIC | Age: 88
End: 2023-08-31
Payer: MEDICARE

## 2023-08-31 PROCEDURE — G0511 PR CHRONIC CARE MGMT, RHC OR FQHC ONLY, 20 MINS OR MORE: ICD-10-PCS | Mod: ,,, | Performed by: INTERNAL MEDICINE

## 2023-08-31 PROCEDURE — G0511 CCM/BHI BY RHC/FQHC 20MIN MO: HCPCS | Mod: ,,, | Performed by: INTERNAL MEDICINE

## 2023-09-11 ENCOUNTER — OFFICE VISIT (OUTPATIENT)
Dept: FAMILY MEDICINE | Facility: CLINIC | Age: 88
End: 2023-09-11
Payer: MEDICARE

## 2023-09-11 VITALS
TEMPERATURE: 98 F | WEIGHT: 145.38 LBS | DIASTOLIC BLOOD PRESSURE: 98 MMHG | RESPIRATION RATE: 18 BRPM | HEIGHT: 69 IN | BODY MASS INDEX: 21.53 KG/M2 | OXYGEN SATURATION: 99 % | HEART RATE: 74 BPM | SYSTOLIC BLOOD PRESSURE: 180 MMHG

## 2023-09-11 DIAGNOSIS — N40.0 BENIGN PROSTATIC HYPERPLASIA, UNSPECIFIED WHETHER LOWER URINARY TRACT SYMPTOMS PRESENT: ICD-10-CM

## 2023-09-11 DIAGNOSIS — I10 ESSENTIAL (PRIMARY) HYPERTENSION: Primary | ICD-10-CM

## 2023-09-11 PROCEDURE — 99214 PR OFFICE/OUTPT VISIT, EST, LEVL IV, 30-39 MIN: ICD-10-PCS | Mod: ,,, | Performed by: INTERNAL MEDICINE

## 2023-09-11 PROCEDURE — 99214 OFFICE O/P EST MOD 30 MIN: CPT | Mod: ,,, | Performed by: INTERNAL MEDICINE

## 2023-09-11 RX ORDER — GABAPENTIN 100 MG/1
100 CAPSULE ORAL NIGHTLY
Qty: 30 CAPSULE | Refills: 2 | Status: SHIPPED | OUTPATIENT
Start: 2023-09-11 | End: 2023-10-18 | Stop reason: SDUPTHER

## 2023-09-11 RX ORDER — HYDROCHLOROTHIAZIDE 25 MG/1
25 TABLET ORAL DAILY
Qty: 90 TABLET | Refills: 1 | Status: SHIPPED | OUTPATIENT
Start: 2023-09-11 | End: 2023-09-18 | Stop reason: SDUPTHER

## 2023-09-11 RX ORDER — LISINOPRIL 20 MG/1
20 TABLET ORAL EVERY MORNING
Qty: 90 TABLET | Refills: 1 | Status: SHIPPED | OUTPATIENT
Start: 2023-09-11 | End: 2023-09-18 | Stop reason: SDUPTHER

## 2023-09-11 RX ORDER — TERAZOSIN 2 MG/1
2 CAPSULE ORAL NIGHTLY
Qty: 90 CAPSULE | Refills: 1 | Status: SHIPPED | OUTPATIENT
Start: 2023-09-11 | End: 2023-10-18 | Stop reason: SDUPTHER

## 2023-09-11 RX ORDER — ACETAMINOPHEN AND CODEINE PHOSPHATE 300; 30 MG/1; MG/1
1 TABLET ORAL
COMMUNITY
Start: 2023-07-25

## 2023-09-14 NOTE — PROGRESS NOTES
Subjective:       Patient ID: Khoa Phan is a 89 y.o. male.    Chief Complaint: Hypertension  Patient presents with a history of hypertension.  He is awake alert he is in acute distress admit taking his blood pressure medicine on time.  Blood pressure is high as 180/8 going to refill his hydrochlorothiazide 25 mg 1 p.o. q.day and lisinopril 20 mg 1 p.o. q.day.  Patient also has BPH will refill his Hytrin 2 mg 1 p.o. q.h.s.   I will see the patient in in 1 week to re-evaluate his blood pressure   Of maintenance will wait for the shingles vaccine.  Hypertension  Pertinent negatives include no chest pain or shortness of breath.     .    Current Medications:    Current Outpatient Medications:     acetaminophen-codeine 300-30mg (TYLENOL #3) 300-30 mg Tab, Take 1 tablet by mouth., Disp: , Rfl:     bicalutamide (CASODEX) 50 MG Tab, Take 1 tablet (50 mg total) by mouth once daily., Disp: 90 tablet, Rfl: 1    timolol maleate 0.5% (TIMOPTIC) 0.5 % Drop, INSTILL 1 DROP INTO BOTH EYES IN THE MORNING, Disp: , Rfl:     travoprost (TRAVATAN Z) 0.004 % ophthalmic solution, Place 1 drop into both eyes nightly., Disp: , Rfl:     gabapentin (NEURONTIN) 100 MG capsule, Take 1 capsule (100 mg total) by mouth every evening., Disp: 30 capsule, Rfl: 2    hydroCHLOROthiazide (HYDRODIURIL) 25 MG tablet, Take 1 tablet (25 mg total) by mouth once daily., Disp: 90 tablet, Rfl: 1    lisinopriL (PRINIVIL,ZESTRIL) 20 MG tablet, Take 1 tablet (20 mg total) by mouth every morning., Disp: 90 tablet, Rfl: 1    terazosin (HYTRIN) 2 MG capsule, Take 1 capsule (2 mg total) by mouth every evening., Disp: 90 capsule, Rfl: 1           Review of Systems   Constitutional:  Negative for appetite change and fatigue.   HENT:  Negative for nasal congestion and rhinorrhea.    Eyes:  Negative for redness and visual disturbance.   Respiratory:  Negative for cough and shortness of breath.    Cardiovascular:  Negative for chest pain and leg swelling.  "  Gastrointestinal:  Negative for abdominal pain, constipation and diarrhea.   Endocrine: Negative for polyuria.   Genitourinary:  Negative for difficulty urinating, dysuria and erectile dysfunction.   Musculoskeletal:  Negative for arthralgias and myalgias.   Integumentary:  Negative for rash and mole/lesion.   All other systems reviewed and are negative.               Vitals:    09/11/23 0904 09/11/23 0905   BP: (!) 165/86 (!) 180/98   BP Location: Right arm Left arm   Patient Position: Sitting    BP Method: Large (Automatic)    Pulse: 74    Resp: 18    Temp: 97.8 °F (36.6 °C)    TempSrc: Temporal    SpO2: 99%    Weight: 66 kg (145 lb 6.4 oz)    Height: 5' 9" (1.753 m)         Physical Exam  Vitals and nursing note reviewed.   Constitutional:       Appearance: Normal appearance.   Cardiovascular:      Rate and Rhythm: Normal rate and regular rhythm.      Pulses: Normal pulses.      Heart sounds: Normal heart sounds.   Pulmonary:      Effort: Pulmonary effort is normal.      Breath sounds: Normal breath sounds.   Abdominal:      General: Abdomen is flat. Bowel sounds are normal.      Palpations: Abdomen is soft.   Musculoskeletal:         General: Normal range of motion.   Skin:     General: Skin is warm and dry.   Neurological:      General: No focal deficit present.      Mental Status: He is alert and oriented to person, place, and time. Mental status is at baseline.           Last Labs:     No visits with results within 1 Month(s) from this visit.   Latest known visit with results is:   Office Visit on 08/17/2022   Component Date Value    Triglycerides 08/17/2022 79     Cholesterol 08/17/2022 216 (H)     HDL Cholesterol 08/17/2022 66 (H)     Cholesterol/HDL Ratio (R* 08/17/2022 3.3     Non-HDL 08/17/2022 150     LDL Calculated 08/17/2022 134     LDL/HDL 08/17/2022 2.0     VLDL 08/17/2022 16        Last Imaging:  X-Ray Hip 2 or 3 views Right (with Pelvis when performed)  Narrative: EXAMINATION:  XR HIP WITH " PELVIS WHEN PERFORMED, 2 OR 3  VIEWS RIGHT    CLINICAL HISTORY:  pain;    FINDINGS:  There is diffuse demineralization.  There is mild osteophyte and superior joint space loss in the right hip.  There are vascular calcifications present.  No acute right hip fracture is seen.  Impression: Demineralization and mild degenerative changes    Electronically signed by: Dee Fletcher  Date:    05/20/2022  Time:    10:58         **Labs and x-rays personally reviewed by me    ** reviewed      Objective:        Assessment:       1. Essential (primary) hypertension        2. Benign prostatic hyperplasia, unspecified whether lower urinary tract symptoms present             Plan:         [unfilled]

## 2023-09-18 ENCOUNTER — OFFICE VISIT (OUTPATIENT)
Dept: FAMILY MEDICINE | Facility: CLINIC | Age: 88
End: 2023-09-18
Payer: MEDICARE

## 2023-09-18 VITALS
OXYGEN SATURATION: 100 % | BODY MASS INDEX: 21.33 KG/M2 | HEART RATE: 70 BPM | TEMPERATURE: 98 F | SYSTOLIC BLOOD PRESSURE: 161 MMHG | HEIGHT: 69 IN | DIASTOLIC BLOOD PRESSURE: 94 MMHG | WEIGHT: 144 LBS | RESPIRATION RATE: 18 BRPM

## 2023-09-18 DIAGNOSIS — I10 ESSENTIAL (PRIMARY) HYPERTENSION: Primary | ICD-10-CM

## 2023-09-18 PROCEDURE — 99213 OFFICE O/P EST LOW 20 MIN: CPT | Mod: ,,, | Performed by: INTERNAL MEDICINE

## 2023-09-18 PROCEDURE — 99213 PR OFFICE/OUTPT VISIT, EST, LEVL III, 20-29 MIN: ICD-10-PCS | Mod: ,,, | Performed by: INTERNAL MEDICINE

## 2023-09-18 RX ORDER — HYDROCHLOROTHIAZIDE 25 MG/1
25 TABLET ORAL DAILY
Qty: 90 TABLET | Refills: 1 | Status: SHIPPED | OUTPATIENT
Start: 2023-09-18 | End: 2023-10-18 | Stop reason: SDUPTHER

## 2023-09-20 RX ORDER — LISINOPRIL 30 MG/1
30 TABLET ORAL NIGHTLY
Qty: 90 TABLET | Refills: 1 | Status: SHIPPED | OUTPATIENT
Start: 2023-09-20 | End: 2023-10-18 | Stop reason: SDUPTHER

## 2023-09-20 NOTE — PROGRESS NOTES
Subjective:       Patient ID: Khoa Phan is a 89 y.o. male.    Chief Complaint: Hypertension  Patient here for follow up visit patient's hypertension blood pressure remains elevated today I will increase lisinopril 30 mg 1 p.o. q.h.s..  Hypertension  Pertinent negatives include no chest pain or shortness of breath.     .    Current Medications:    Current Outpatient Medications:     acetaminophen-codeine 300-30mg (TYLENOL #3) 300-30 mg Tab, Take 1 tablet by mouth., Disp: , Rfl:     bicalutamide (CASODEX) 50 MG Tab, Take 1 tablet (50 mg total) by mouth once daily., Disp: 90 tablet, Rfl: 1    gabapentin (NEURONTIN) 100 MG capsule, Take 1 capsule (100 mg total) by mouth every evening., Disp: 30 capsule, Rfl: 2    terazosin (HYTRIN) 2 MG capsule, Take 1 capsule (2 mg total) by mouth every evening., Disp: 90 capsule, Rfl: 1    timolol maleate 0.5% (TIMOPTIC) 0.5 % Drop, INSTILL 1 DROP INTO BOTH EYES IN THE MORNING, Disp: , Rfl:     travoprost (TRAVATAN Z) 0.004 % ophthalmic solution, Place 1 drop into both eyes nightly., Disp: , Rfl:     hydroCHLOROthiazide (HYDRODIURIL) 25 MG tablet, Take 1 tablet (25 mg total) by mouth once daily., Disp: 90 tablet, Rfl: 1    lisinopriL (PRINIVIL,ZESTRIL) 30 MG tablet, Take 1 tablet (30 mg total) by mouth every evening., Disp: 90 tablet, Rfl: 1           Review of Systems   Constitutional:  Negative for appetite change and fatigue.   HENT:  Negative for nasal congestion and rhinorrhea.    Eyes:  Negative for redness and visual disturbance.   Respiratory:  Negative for cough and shortness of breath.    Cardiovascular:  Negative for chest pain and leg swelling.   Gastrointestinal:  Negative for abdominal pain, constipation and diarrhea.   Endocrine: Negative for polyuria.   Genitourinary:  Negative for difficulty urinating, dysuria and erectile dysfunction.   Musculoskeletal:  Negative for arthralgias and myalgias.   Integumentary:  Negative for rash and mole/lesion.   All other  "systems reviewed and are negative.               Vitals:    09/18/23 0947   BP: (!) 161/94   BP Location: Right arm   Patient Position: Sitting   BP Method: Large (Automatic)   Pulse: 70   Resp: 18   Temp: 97.8 °F (36.6 °C)   TempSrc: Temporal   SpO2: 100%   Weight: 65.3 kg (144 lb)   Height: 5' 9" (1.753 m)        Physical Exam  Vitals and nursing note reviewed.   Constitutional:       Appearance: Normal appearance.   Cardiovascular:      Rate and Rhythm: Normal rate and regular rhythm.      Pulses: Normal pulses.      Heart sounds: Normal heart sounds.   Pulmonary:      Effort: Pulmonary effort is normal.      Breath sounds: Normal breath sounds.   Abdominal:      General: Abdomen is flat. Bowel sounds are normal.      Palpations: Abdomen is soft.   Musculoskeletal:         General: Normal range of motion.   Skin:     General: Skin is warm and dry.   Neurological:      General: No focal deficit present.      Mental Status: He is alert and oriented to person, place, and time. Mental status is at baseline.           Last Labs:     No visits with results within 1 Month(s) from this visit.   Latest known visit with results is:   Office Visit on 08/17/2022   Component Date Value    Triglycerides 08/17/2022 79     Cholesterol 08/17/2022 216 (H)     HDL Cholesterol 08/17/2022 66 (H)     Cholesterol/HDL Ratio (R* 08/17/2022 3.3     Non-HDL 08/17/2022 150     LDL Calculated 08/17/2022 134     LDL/HDL 08/17/2022 2.0     VLDL 08/17/2022 16        Last Imaging:  X-Ray Hip 2 or 3 views Right (with Pelvis when performed)  Narrative: EXAMINATION:  XR HIP WITH PELVIS WHEN PERFORMED, 2 OR 3  VIEWS RIGHT    CLINICAL HISTORY:  pain;    FINDINGS:  There is diffuse demineralization.  There is mild osteophyte and superior joint space loss in the right hip.  There are vascular calcifications present.  No acute right hip fracture is seen.  Impression: Demineralization and mild degenerative changes    Electronically signed by: Dee" Chance  Date:    05/20/2022  Time:    10:58         **Labs and x-rays personally reviewed by me    ** reviewed      Objective:        Assessment:       1. Essential (primary) hypertension             Plan:         [unfilled]

## 2023-09-30 ENCOUNTER — EXTERNAL CHRONIC CARE MANAGEMENT (OUTPATIENT)
Dept: FAMILY MEDICINE | Facility: CLINIC | Age: 88
End: 2023-09-30
Payer: MEDICARE

## 2023-09-30 PROCEDURE — G0511 CCM/BHI BY RHC/FQHC 20MIN MO: HCPCS | Mod: ,,, | Performed by: INTERNAL MEDICINE

## 2023-09-30 PROCEDURE — G0511 PR CHRONIC CARE MGMT, RHC OR FQHC ONLY, 20 MINS OR MORE: ICD-10-PCS | Mod: ,,, | Performed by: INTERNAL MEDICINE

## 2023-10-18 ENCOUNTER — OFFICE VISIT (OUTPATIENT)
Dept: FAMILY MEDICINE | Facility: CLINIC | Age: 88
End: 2023-10-18
Payer: MEDICARE

## 2023-10-18 VITALS
HEART RATE: 73 BPM | DIASTOLIC BLOOD PRESSURE: 75 MMHG | TEMPERATURE: 98 F | WEIGHT: 149.81 LBS | HEIGHT: 69 IN | BODY MASS INDEX: 22.19 KG/M2 | OXYGEN SATURATION: 100 % | SYSTOLIC BLOOD PRESSURE: 131 MMHG | RESPIRATION RATE: 17 BRPM

## 2023-10-18 DIAGNOSIS — I10 ESSENTIAL (PRIMARY) HYPERTENSION: Primary | ICD-10-CM

## 2023-10-18 PROCEDURE — G0008 ADMIN INFLUENZA VIRUS VAC: HCPCS | Mod: ,,, | Performed by: INTERNAL MEDICINE

## 2023-10-18 PROCEDURE — 99213 PR OFFICE/OUTPT VISIT, EST, LEVL III, 20-29 MIN: ICD-10-PCS | Mod: ,,, | Performed by: INTERNAL MEDICINE

## 2023-10-18 PROCEDURE — 90694 FLU VACCINE - QUADRIVALENT - ADJUVANTED: ICD-10-PCS | Mod: ,,, | Performed by: INTERNAL MEDICINE

## 2023-10-18 PROCEDURE — G0008 FLU VACCINE - QUADRIVALENT - ADJUVANTED: ICD-10-PCS | Mod: ,,, | Performed by: INTERNAL MEDICINE

## 2023-10-18 PROCEDURE — 99213 OFFICE O/P EST LOW 20 MIN: CPT | Mod: ,,, | Performed by: INTERNAL MEDICINE

## 2023-10-18 PROCEDURE — 90694 VACC AIIV4 NO PRSRV 0.5ML IM: CPT | Mod: ,,, | Performed by: INTERNAL MEDICINE

## 2023-10-18 RX ORDER — HYDROCHLOROTHIAZIDE 25 MG/1
25 TABLET ORAL DAILY
Qty: 90 TABLET | Refills: 1 | Status: SHIPPED | OUTPATIENT
Start: 2023-10-18 | End: 2024-01-18 | Stop reason: SDUPTHER

## 2023-10-18 RX ORDER — TERAZOSIN 2 MG/1
2 CAPSULE ORAL NIGHTLY
Qty: 90 CAPSULE | Refills: 1 | Status: SHIPPED | OUTPATIENT
Start: 2023-10-18 | End: 2024-01-18 | Stop reason: SDUPTHER

## 2023-10-18 RX ORDER — GABAPENTIN 100 MG/1
100 CAPSULE ORAL NIGHTLY
Qty: 30 CAPSULE | Refills: 2 | Status: SHIPPED | OUTPATIENT
Start: 2023-10-18 | End: 2023-12-07 | Stop reason: SDUPTHER

## 2023-10-18 RX ORDER — LISINOPRIL 30 MG/1
30 TABLET ORAL NIGHTLY
Qty: 90 TABLET | Refills: 1 | Status: SHIPPED | OUTPATIENT
Start: 2023-10-18 | End: 2024-01-09 | Stop reason: SDUPTHER

## 2023-10-20 NOTE — PROGRESS NOTES
Subjective:       Patient ID: Khoa Phan is a 89 y.o. male.    Chief Complaint: Hypertension  Patient seen and evaluated patient here for follow-up blood pressure check patient has chronic hypertension blood pressure is down to 131/70 patient does need refills of several meds I am going to refill lisinopril, hydrochlorothiazide gabapentin and Hytrin.    Also give the patient a flu shot today.  Hypertension  Pertinent negatives include no chest pain or shortness of breath.     .    Current Medications:    Current Outpatient Medications:     acetaminophen-codeine 300-30mg (TYLENOL #3) 300-30 mg Tab, Take 1 tablet by mouth., Disp: , Rfl:     bicalutamide (CASODEX) 50 MG Tab, Take 1 tablet (50 mg total) by mouth once daily., Disp: 90 tablet, Rfl: 1    timolol maleate 0.5% (TIMOPTIC) 0.5 % Drop, INSTILL 1 DROP INTO BOTH EYES IN THE MORNING, Disp: , Rfl:     travoprost (TRAVATAN Z) 0.004 % ophthalmic solution, Place 1 drop into both eyes nightly., Disp: , Rfl:     gabapentin (NEURONTIN) 100 MG capsule, Take 1 capsule (100 mg total) by mouth every evening., Disp: 30 capsule, Rfl: 2    hydroCHLOROthiazide (HYDRODIURIL) 25 MG tablet, Take 1 tablet (25 mg total) by mouth once daily., Disp: 90 tablet, Rfl: 1    lisinopriL (PRINIVIL,ZESTRIL) 30 MG tablet, Take 1 tablet (30 mg total) by mouth every evening., Disp: 90 tablet, Rfl: 1    terazosin (HYTRIN) 2 MG capsule, Take 1 capsule (2 mg total) by mouth every evening., Disp: 90 capsule, Rfl: 1           Review of Systems   Constitutional:  Negative for appetite change and fatigue.   HENT:  Negative for nasal congestion and rhinorrhea.    Eyes:  Negative for redness and visual disturbance.   Respiratory:  Negative for cough and shortness of breath.    Cardiovascular:  Negative for chest pain and leg swelling.   Gastrointestinal:  Negative for abdominal pain, constipation and diarrhea.   Endocrine: Negative for polyuria.   Genitourinary:  Negative for difficulty urinating,  "dysuria and erectile dysfunction.   Musculoskeletal:  Negative for arthralgias and myalgias.   Integumentary:  Negative for rash and mole/lesion.   All other systems reviewed and are negative.               Vitals:    10/18/23 0854   BP: 131/75   BP Location: Right arm   Patient Position: Sitting   BP Method: Large (Automatic)   Pulse: 73   Resp: 17   Temp: 97.6 °F (36.4 °C)   TempSrc: Temporal   SpO2: 100%   Weight: 67.9 kg (149 lb 12.8 oz)   Height: 5' 9" (1.753 m)        Physical Exam  Vitals and nursing note reviewed.   Constitutional:       Appearance: Normal appearance.   Cardiovascular:      Rate and Rhythm: Normal rate and regular rhythm.      Pulses: Normal pulses.      Heart sounds: Normal heart sounds.   Pulmonary:      Effort: Pulmonary effort is normal.      Breath sounds: Normal breath sounds.   Abdominal:      General: Abdomen is flat. Bowel sounds are normal.      Palpations: Abdomen is soft.   Musculoskeletal:         General: Normal range of motion.   Skin:     General: Skin is warm and dry.   Neurological:      General: No focal deficit present.      Mental Status: He is alert and oriented to person, place, and time. Mental status is at baseline.           Last Labs:     No visits with results within 1 Month(s) from this visit.   Latest known visit with results is:   Office Visit on 08/17/2022   Component Date Value    Triglycerides 08/17/2022 79     Cholesterol 08/17/2022 216 (H)     HDL Cholesterol 08/17/2022 66 (H)     Cholesterol/HDL Ratio (R* 08/17/2022 3.3     Non-HDL 08/17/2022 150     LDL Calculated 08/17/2022 134     LDL/HDL 08/17/2022 2.0     VLDL 08/17/2022 16        Last Imaging:  X-Ray Hip 2 or 3 views Right (with Pelvis when performed)  Narrative: EXAMINATION:  XR HIP WITH PELVIS WHEN PERFORMED, 2 OR 3  VIEWS RIGHT    CLINICAL HISTORY:  pain;    FINDINGS:  There is diffuse demineralization.  There is mild osteophyte and superior joint space loss in the right hip.  There are vascular " calcifications present.  No acute right hip fracture is seen.  Impression: Demineralization and mild degenerative changes    Electronically signed by: Dee Fletcher  Date:    05/20/2022  Time:    10:58         **Labs and x-rays personally reviewed by me    ** reviewed      Objective:        Assessment:       1. Essential (primary) hypertension             Plan:         [unfilled]

## 2023-10-31 ENCOUNTER — EXTERNAL CHRONIC CARE MANAGEMENT (OUTPATIENT)
Dept: FAMILY MEDICINE | Facility: CLINIC | Age: 88
End: 2023-10-31
Payer: MEDICARE

## 2023-10-31 PROCEDURE — G0511 PR CHRONIC CARE MGMT, RHC OR FQHC ONLY, 20 MINS OR MORE: ICD-10-PCS | Mod: ,,, | Performed by: INTERNAL MEDICINE

## 2023-10-31 PROCEDURE — G0511 CCM/BHI BY RHC/FQHC 20MIN MO: HCPCS | Mod: ,,, | Performed by: INTERNAL MEDICINE

## 2023-11-30 ENCOUNTER — EXTERNAL CHRONIC CARE MANAGEMENT (OUTPATIENT)
Dept: FAMILY MEDICINE | Facility: CLINIC | Age: 88
End: 2023-11-30
Payer: MEDICARE

## 2023-11-30 PROCEDURE — G0511 PR CHRONIC CARE MGMT, RHC OR FQHC ONLY, 20 MINS OR MORE: ICD-10-PCS | Mod: ,,, | Performed by: INTERNAL MEDICINE

## 2023-11-30 PROCEDURE — G0511 CCM/BHI BY RHC/FQHC 20MIN MO: HCPCS | Mod: ,,, | Performed by: INTERNAL MEDICINE

## 2023-12-11 RX ORDER — GABAPENTIN 100 MG/1
100 CAPSULE ORAL NIGHTLY
Qty: 30 CAPSULE | Refills: 2 | Status: SHIPPED | OUTPATIENT
Start: 2023-12-11 | End: 2024-01-18 | Stop reason: SDUPTHER

## 2023-12-31 ENCOUNTER — EXTERNAL CHRONIC CARE MANAGEMENT (OUTPATIENT)
Dept: FAMILY MEDICINE | Facility: CLINIC | Age: 88
End: 2023-12-31
Payer: MEDICARE

## 2023-12-31 PROCEDURE — G0511 CCM/BHI BY RHC/FQHC 20MIN MO: HCPCS | Mod: ,,, | Performed by: INTERNAL MEDICINE

## 2024-01-09 RX ORDER — LISINOPRIL 30 MG/1
30 TABLET ORAL NIGHTLY
Qty: 90 TABLET | Refills: 1 | Status: SHIPPED | OUTPATIENT
Start: 2024-01-09 | End: 2024-01-18 | Stop reason: SDUPTHER

## 2024-01-18 ENCOUNTER — OFFICE VISIT (OUTPATIENT)
Dept: FAMILY MEDICINE | Facility: CLINIC | Age: 89
End: 2024-01-18
Payer: MEDICARE

## 2024-01-18 VITALS
OXYGEN SATURATION: 96 % | TEMPERATURE: 97 F | RESPIRATION RATE: 18 BRPM | HEIGHT: 69 IN | SYSTOLIC BLOOD PRESSURE: 186 MMHG | DIASTOLIC BLOOD PRESSURE: 107 MMHG | BODY MASS INDEX: 22.31 KG/M2 | WEIGHT: 150.63 LBS | HEART RATE: 80 BPM

## 2024-01-18 DIAGNOSIS — I10 ESSENTIAL (PRIMARY) HYPERTENSION: Primary | ICD-10-CM

## 2024-01-18 PROBLEM — J45.909 ASTHMA: Status: ACTIVE | Noted: 2024-01-18

## 2024-01-18 PROBLEM — E11.9 TYPE 2 DIABETES MELLITUS WITHOUT COMPLICATION, WITHOUT LONG-TERM CURRENT USE OF INSULIN: Status: ACTIVE | Noted: 2024-01-18

## 2024-01-18 PROBLEM — J30.1 NON-SEASONAL ALLERGIC RHINITIS DUE TO POLLEN: Status: ACTIVE | Noted: 2024-01-18

## 2024-01-18 PROCEDURE — 99214 OFFICE O/P EST MOD 30 MIN: CPT | Mod: ,,, | Performed by: INTERNAL MEDICINE

## 2024-01-18 RX ORDER — LISINOPRIL 30 MG/1
30 TABLET ORAL NIGHTLY
Qty: 90 TABLET | Refills: 1 | Status: SHIPPED | OUTPATIENT
Start: 2024-01-18

## 2024-01-18 RX ORDER — BICALUTAMIDE 50 MG/1
50 TABLET, FILM COATED ORAL DAILY
Qty: 90 TABLET | Refills: 1 | Status: SHIPPED | OUTPATIENT
Start: 2024-01-18

## 2024-01-18 RX ORDER — LISINOPRIL AND HYDROCHLOROTHIAZIDE 20; 25 MG/1; MG/1
1 TABLET ORAL DAILY
Qty: 90 TABLET | Refills: 1 | Status: SHIPPED | OUTPATIENT
Start: 2024-01-18 | End: 2024-04-25

## 2024-01-18 RX ORDER — TERAZOSIN 2 MG/1
2 CAPSULE ORAL NIGHTLY
Qty: 90 CAPSULE | Refills: 1 | Status: SHIPPED | OUTPATIENT
Start: 2024-01-18

## 2024-01-18 RX ORDER — GABAPENTIN 100 MG/1
100 CAPSULE ORAL NIGHTLY
Qty: 30 CAPSULE | Refills: 2 | Status: SHIPPED | OUTPATIENT
Start: 2024-01-18

## 2024-01-18 RX ORDER — HYDROCHLOROTHIAZIDE 25 MG/1
25 TABLET ORAL DAILY
Qty: 90 TABLET | Refills: 1 | Status: SHIPPED | OUTPATIENT
Start: 2024-01-18

## 2024-01-18 NOTE — PROGRESS NOTES
"Subjective:       Patient ID: Khoa Phan is a 89 y.o. male.    Chief Complaint: No chief complaint on file.    HPI  .  Patient seen evaluated today patient is awake alert no acute distress pressure is 167 systolic also systolic blood pressure 186.  plan now is to start lisinopril/thiazide 40s/12 5 mg 1 p.o. q.day. blood pressure is not at goal  Current Medications:    Current Outpatient Medications:     acetaminophen-codeine 300-30mg (TYLENOL #3) 300-30 mg Tab, Take 1 tablet by mouth., Disp: , Rfl:     timolol maleate 0.5% (TIMOPTIC) 0.5 % Drop, INSTILL 1 DROP INTO BOTH EYES IN THE MORNING, Disp: , Rfl:     travoprost (TRAVATAN Z) 0.004 % ophthalmic solution, Place 1 drop into both eyes nightly., Disp: , Rfl:     bicalutamide (CASODEX) 50 MG Tab, Take 1 tablet (50 mg total) by mouth once daily., Disp: 90 tablet, Rfl: 1    gabapentin (NEURONTIN) 100 MG capsule, Take 1 capsule (100 mg total) by mouth every evening., Disp: 30 capsule, Rfl: 2    hydroCHLOROthiazide (HYDRODIURIL) 25 MG tablet, Take 1 tablet (25 mg total) by mouth once daily., Disp: 90 tablet, Rfl: 1    lisinopriL (PRINIVIL,ZESTRIL) 30 MG tablet, Take 1 tablet (30 mg total) by mouth every evening., Disp: 90 tablet, Rfl: 1    lisinopriL-hydrochlorothiazide (PRINZIDE,ZESTORETIC) 20-25 mg Tab, Take 1 tablet by mouth once daily., Disp: 90 tablet, Rfl: 1    terazosin (HYTRIN) 2 MG capsule, Take 1 capsule (2 mg total) by mouth every evening., Disp: 90 capsule, Rfl: 1           Review of Systems             Vitals:    01/18/24 0901 01/18/24 0906   BP: (!) 167/100 (!) 186/107   BP Location: Right arm Left arm   Patient Position: Sitting Sitting   BP Method: Large (Automatic)    Pulse: 80    Resp: 18    Temp: 97.3 °F (36.3 °C)    TempSrc: Temporal    SpO2: 96%    Weight: 68.3 kg (150 lb 9.6 oz)    Height: 5' 9" (1.753 m)         Physical Exam  Vitals and nursing note reviewed.   Constitutional:       Appearance: Normal appearance.   Cardiovascular:      " Rate and Rhythm: Normal rate and regular rhythm.      Pulses: Normal pulses.      Heart sounds: Normal heart sounds.   Pulmonary:      Effort: Pulmonary effort is normal.      Breath sounds: Normal breath sounds.   Abdominal:      General: Abdomen is flat. Bowel sounds are normal.      Palpations: Abdomen is soft.   Musculoskeletal:         General: Normal range of motion.   Skin:     General: Skin is warm and dry.   Neurological:      General: No focal deficit present.      Mental Status: He is alert and oriented to person, place, and time. Mental status is at baseline.           Last Labs:     No visits with results within 1 Month(s) from this visit.   Latest known visit with results is:   Office Visit on 08/17/2022   Component Date Value    Triglycerides 08/17/2022 79     Cholesterol 08/17/2022 216 (H)     HDL Cholesterol 08/17/2022 66 (H)     Cholesterol/HDL Ratio (R* 08/17/2022 3.3     Non-HDL 08/17/2022 150     LDL Calculated 08/17/2022 134     LDL/HDL 08/17/2022 2.0     VLDL 08/17/2022 16        Last Imaging:  X-Ray Hip 2 or 3 views Right (with Pelvis when performed)  Narrative: EXAMINATION:  XR HIP WITH PELVIS WHEN PERFORMED, 2 OR 3  VIEWS RIGHT    CLINICAL HISTORY:  pain;    FINDINGS:  There is diffuse demineralization.  There is mild osteophyte and superior joint space loss in the right hip.  There are vascular calcifications present.  No acute right hip fracture is seen.  Impression: Demineralization and mild degenerative changes    Electronically signed by: Dee Fletcher  Date:    05/20/2022  Time:    10:58         **Labs and x-rays personally reviewed by me    ** reviewed      Objective:        Assessment:            Plan:

## 2024-01-25 ENCOUNTER — OFFICE VISIT (OUTPATIENT)
Dept: FAMILY MEDICINE | Facility: CLINIC | Age: 89
End: 2024-01-25
Payer: MEDICARE

## 2024-01-25 VITALS
BODY MASS INDEX: 22.51 KG/M2 | RESPIRATION RATE: 18 BRPM | SYSTOLIC BLOOD PRESSURE: 159 MMHG | OXYGEN SATURATION: 95 % | HEART RATE: 83 BPM | DIASTOLIC BLOOD PRESSURE: 92 MMHG | TEMPERATURE: 98 F | WEIGHT: 152 LBS | HEIGHT: 69 IN

## 2024-01-25 DIAGNOSIS — I10 ESSENTIAL (PRIMARY) HYPERTENSION: Primary | ICD-10-CM

## 2024-01-25 PROCEDURE — 99212 OFFICE O/P EST SF 10 MIN: CPT | Mod: ,,, | Performed by: INTERNAL MEDICINE

## 2024-01-27 NOTE — PROGRESS NOTES
"Subjective:       Patient ID: Khoa Phan is a 89 y.o. male.    Chief Complaint: Hypertension    Hypertension      .  Patient found to have a central hypertension patient voiced no complaints blood pressure is stable will continue current medical management    Current Medications:    Current Outpatient Medications:     acetaminophen-codeine 300-30mg (TYLENOL #3) 300-30 mg Tab, Take 1 tablet by mouth., Disp: , Rfl:     bicalutamide (CASODEX) 50 MG Tab, Take 1 tablet (50 mg total) by mouth once daily., Disp: 90 tablet, Rfl: 1    gabapentin (NEURONTIN) 100 MG capsule, Take 1 capsule (100 mg total) by mouth every evening., Disp: 30 capsule, Rfl: 2    hydroCHLOROthiazide (HYDRODIURIL) 25 MG tablet, Take 1 tablet (25 mg total) by mouth once daily., Disp: 90 tablet, Rfl: 1    lisinopriL (PRINIVIL,ZESTRIL) 30 MG tablet, Take 1 tablet (30 mg total) by mouth every evening., Disp: 90 tablet, Rfl: 1    lisinopriL-hydrochlorothiazide (PRINZIDE,ZESTORETIC) 20-25 mg Tab, Take 1 tablet by mouth once daily., Disp: 90 tablet, Rfl: 1    terazosin (HYTRIN) 2 MG capsule, Take 1 capsule (2 mg total) by mouth every evening., Disp: 90 capsule, Rfl: 1    timolol maleate 0.5% (TIMOPTIC) 0.5 % Drop, INSTILL 1 DROP INTO BOTH EYES IN THE MORNING, Disp: , Rfl:     travoprost (TRAVATAN Z) 0.004 % ophthalmic solution, Place 1 drop into both eyes nightly., Disp: , Rfl:            Review of Systems             Vitals:    01/25/24 0918 01/25/24 1027   BP: (!) 151/83 (!) 159/92   BP Location: Left arm Right arm   Patient Position: Sitting Sitting   BP Method: Large (Automatic)    Pulse: 83    Resp: 18    Temp: 97.7 °F (36.5 °C)    TempSrc: Temporal    SpO2: 95%    Weight: 68.9 kg (152 lb)    Height: 5' 9" (1.753 m)         Physical Exam  Vitals and nursing note reviewed.   Constitutional:       Appearance: Normal appearance.   Cardiovascular:      Rate and Rhythm: Normal rate and regular rhythm.      Pulses: Normal pulses.      Heart sounds: " Normal heart sounds.   Pulmonary:      Effort: Pulmonary effort is normal.      Breath sounds: Normal breath sounds.   Abdominal:      General: Abdomen is flat. Bowel sounds are normal.      Palpations: Abdomen is soft.   Musculoskeletal:         General: Normal range of motion.   Skin:     General: Skin is warm and dry.   Neurological:      General: No focal deficit present.      Mental Status: He is alert and oriented to person, place, and time. Mental status is at baseline.           Last Labs:     No visits with results within 1 Month(s) from this visit.   Latest known visit with results is:   Office Visit on 08/17/2022   Component Date Value    Triglycerides 08/17/2022 79     Cholesterol 08/17/2022 216 (H)     HDL Cholesterol 08/17/2022 66 (H)     Cholesterol/HDL Ratio (R* 08/17/2022 3.3     Non-HDL 08/17/2022 150     LDL Calculated 08/17/2022 134     LDL/HDL 08/17/2022 2.0     VLDL 08/17/2022 16        Last Imaging:  X-Ray Hip 2 or 3 views Right (with Pelvis when performed)  Narrative: EXAMINATION:  XR HIP WITH PELVIS WHEN PERFORMED, 2 OR 3  VIEWS RIGHT    CLINICAL HISTORY:  pain;    FINDINGS:  There is diffuse demineralization.  There is mild osteophyte and superior joint space loss in the right hip.  There are vascular calcifications present.  No acute right hip fracture is seen.  Impression: Demineralization and mild degenerative changes    Electronically signed by: Dee Fletcher  Date:    05/20/2022  Time:    10:58         **Labs and x-rays personally reviewed by me    ** reviewed      Objective:        Assessment:       1. Essential (primary) hypertension             Plan:         1. Essential (primary) hypertension

## 2024-01-31 ENCOUNTER — EXTERNAL CHRONIC CARE MANAGEMENT (OUTPATIENT)
Dept: FAMILY MEDICINE | Facility: CLINIC | Age: 89
End: 2024-01-31
Payer: MEDICARE

## 2024-01-31 PROCEDURE — G0511 CCM/BHI BY RHC/FQHC 20MIN MO: HCPCS | Mod: ,,, | Performed by: INTERNAL MEDICINE

## 2024-02-29 ENCOUNTER — EXTERNAL CHRONIC CARE MANAGEMENT (OUTPATIENT)
Dept: FAMILY MEDICINE | Facility: CLINIC | Age: 89
End: 2024-02-29
Payer: MEDICARE

## 2024-02-29 PROCEDURE — G0511 CCM/BHI BY RHC/FQHC 20MIN MO: HCPCS | Mod: ,,, | Performed by: INTERNAL MEDICINE

## 2024-03-31 ENCOUNTER — EXTERNAL CHRONIC CARE MANAGEMENT (OUTPATIENT)
Dept: FAMILY MEDICINE | Facility: CLINIC | Age: 89
End: 2024-03-31
Payer: MEDICARE

## 2024-03-31 PROCEDURE — G0511 CCM/BHI BY RHC/FQHC 20MIN MO: HCPCS | Mod: ,,, | Performed by: INTERNAL MEDICINE

## 2024-04-25 ENCOUNTER — OFFICE VISIT (OUTPATIENT)
Dept: FAMILY MEDICINE | Facility: CLINIC | Age: 89
End: 2024-04-25
Payer: MEDICARE

## 2024-04-25 VITALS
HEART RATE: 78 BPM | DIASTOLIC BLOOD PRESSURE: 94 MMHG | TEMPERATURE: 97 F | WEIGHT: 149 LBS | BODY MASS INDEX: 22.07 KG/M2 | HEIGHT: 69 IN | RESPIRATION RATE: 17 BRPM | SYSTOLIC BLOOD PRESSURE: 167 MMHG | OXYGEN SATURATION: 100 %

## 2024-04-25 DIAGNOSIS — I10 ESSENTIAL (PRIMARY) HYPERTENSION: Primary | ICD-10-CM

## 2024-04-25 DIAGNOSIS — N18.9 CHRONIC KIDNEY DISEASE, UNSPECIFIED CKD STAGE: ICD-10-CM

## 2024-04-25 PROCEDURE — 99214 OFFICE O/P EST MOD 30 MIN: CPT | Mod: ,,, | Performed by: INTERNAL MEDICINE

## 2024-04-25 RX ORDER — AMLODIPINE BESYLATE 5 MG/1
5 TABLET ORAL DAILY
COMMUNITY
Start: 2024-04-23 | End: 2024-04-25 | Stop reason: SDUPTHER

## 2024-04-25 RX ORDER — AMLODIPINE BESYLATE 10 MG/1
10 TABLET ORAL DAILY
Qty: 90 TABLET | Refills: 1 | Status: SHIPPED | OUTPATIENT
Start: 2024-04-25

## 2024-04-30 ENCOUNTER — EXTERNAL CHRONIC CARE MANAGEMENT (OUTPATIENT)
Dept: FAMILY MEDICINE | Facility: CLINIC | Age: 89
End: 2024-04-30
Payer: MEDICARE

## 2024-04-30 PROCEDURE — G0511 CCM/BHI BY RHC/FQHC 20MIN MO: HCPCS | Mod: ,,, | Performed by: INTERNAL MEDICINE

## 2024-04-30 NOTE — PROGRESS NOTES
Subjective:       Patient ID: Khoa Phan is a 89 y.o. male.    Chief Complaint: Follow-up (3 month fu htn )    HPI  .  Patient seen in follow up today patient has chronic kidney disease and chronic hypertension blood pressure is not at goal and lisinopril hydrochlorothiazide patient stated that he has not working for him.  He states that he was told by Nephrology that I should consider stopping the hydrochlorothiazide and lisinopril due to chronic kidney disease.  Will DC hydrochlorothiazide/lisinopril    Current Medications:    Current Outpatient Medications:     acetaminophen-codeine 300-30mg (TYLENOL #3) 300-30 mg Tab, Take 1 tablet by mouth., Disp: , Rfl:     bicalutamide (CASODEX) 50 MG Tab, Take 1 tablet (50 mg total) by mouth once daily., Disp: 90 tablet, Rfl: 1    gabapentin (NEURONTIN) 100 MG capsule, Take 1 capsule (100 mg total) by mouth every evening., Disp: 30 capsule, Rfl: 2    hydroCHLOROthiazide (HYDRODIURIL) 25 MG tablet, Take 1 tablet (25 mg total) by mouth once daily., Disp: 90 tablet, Rfl: 1    lisinopriL (PRINIVIL,ZESTRIL) 30 MG tablet, Take 1 tablet (30 mg total) by mouth every evening., Disp: 90 tablet, Rfl: 1    terazosin (HYTRIN) 2 MG capsule, Take 1 capsule (2 mg total) by mouth every evening., Disp: 90 capsule, Rfl: 1    timolol maleate 0.5% (TIMOPTIC) 0.5 % Drop, INSTILL 1 DROP INTO BOTH EYES IN THE MORNING, Disp: , Rfl:     travoprost (TRAVATAN Z) 0.004 % ophthalmic solution, Place 1 drop into both eyes nightly., Disp: , Rfl:     amLODIPine (NORVASC) 10 MG tablet, Take 1 tablet (10 mg total) by mouth once daily., Disp: 90 tablet, Rfl: 1           ROS  Twelve point system reviewed, unremarkable except for stated above in HPI.        Objective:         Vitals:    04/25/24 0840 04/25/24 0851   BP: (!) 167/68 (!) 167/94   BP Location: Left arm    Patient Position: Sitting    BP Method: Large (Automatic)    Pulse: 78    Resp: 17    Temp: 97.1 °F (36.2 °C)    TempSrc: Temporal    SpO2:  "100%    Weight: 67.6 kg (149 lb)    Height: 5' 9" (1.753 m)         Physical Exam     Patient is awake alert oriented person place and  Lungs are clear to auscultation bilaterally no crackles or wheezes   Cardiovascular S1-S2 regular rate and rhythm no murmurs rubs or gallops   Abdomen is soft positive bowel sounds nontender, extremities no clubbing cyanosis edema  Neuro no focal neurological deficits  Skin warm and dry.     Last Labs:     No visits with results within 1 Month(s) from this visit.   Latest known visit with results is:   Office Visit on 08/17/2022   Component Date Value    Triglycerides 08/17/2022 79     Cholesterol 08/17/2022 216 (H)     HDL Cholesterol 08/17/2022 66 (H)     Cholesterol/HDL Ratio (R* 08/17/2022 3.3     Non-HDL 08/17/2022 150     LDL Calculated 08/17/2022 134     LDL/HDL 08/17/2022 2.0     VLDL 08/17/2022 16        Last Imaging:  X-Ray Hip 2 or 3 views Right (with Pelvis when performed)  Narrative: EXAMINATION:  XR HIP WITH PELVIS WHEN PERFORMED, 2 OR 3  VIEWS RIGHT    CLINICAL HISTORY:  pain;    FINDINGS:  There is diffuse demineralization.  There is mild osteophyte and superior joint space loss in the right hip.  There are vascular calcifications present.  No acute right hip fracture is seen.  Impression: Demineralization and mild degenerative changes    Electronically signed by: Dee Fletcher  Date:    05/20/2022  Time:    10:58         **Labs and x-rays personally reviewed by me    ** reviewed           Assessment & Plan:       1. Essential (primary) hypertension    2. Chronic kidney disease, unspecified CKD stage    Other orders  -     amLODIPine (NORVASC) 10 MG tablet; Take 1 tablet (10 mg total) by mouth once daily.  Dispense: 90 tablet; Refill: 1            Jabari Stafford MD    "

## 2024-05-16 ENCOUNTER — OFFICE VISIT (OUTPATIENT)
Dept: FAMILY MEDICINE | Facility: CLINIC | Age: 89
End: 2024-05-16
Payer: MEDICARE

## 2024-05-16 VITALS
OXYGEN SATURATION: 96 % | DIASTOLIC BLOOD PRESSURE: 76 MMHG | BODY MASS INDEX: 21.77 KG/M2 | WEIGHT: 147 LBS | TEMPERATURE: 98 F | SYSTOLIC BLOOD PRESSURE: 133 MMHG | RESPIRATION RATE: 18 BRPM | HEIGHT: 69 IN | HEART RATE: 73 BPM

## 2024-05-16 DIAGNOSIS — I10 ESSENTIAL (PRIMARY) HYPERTENSION: Primary | ICD-10-CM

## 2024-05-16 PROCEDURE — 99212 OFFICE O/P EST SF 10 MIN: CPT | Mod: ,,, | Performed by: INTERNAL MEDICINE

## 2024-05-21 NOTE — PROGRESS NOTES
"Subjective:       Patient ID: Khoa Phan is a 89 y.o. male.    Chief Complaint: Hypertension    HPI  .  Patient here for blood pressure evaluation blood pressure has stabilized.    Current Medications:    Current Outpatient Medications:     acetaminophen-codeine 300-30mg (TYLENOL #3) 300-30 mg Tab, Take 1 tablet by mouth., Disp: , Rfl:     amLODIPine (NORVASC) 10 MG tablet, Take 1 tablet (10 mg total) by mouth once daily., Disp: 90 tablet, Rfl: 1    bicalutamide (CASODEX) 50 MG Tab, Take 1 tablet (50 mg total) by mouth once daily., Disp: 90 tablet, Rfl: 1    gabapentin (NEURONTIN) 100 MG capsule, Take 1 capsule (100 mg total) by mouth every evening., Disp: 30 capsule, Rfl: 2    hydroCHLOROthiazide (HYDRODIURIL) 25 MG tablet, Take 1 tablet (25 mg total) by mouth once daily., Disp: 90 tablet, Rfl: 1    lisinopriL (PRINIVIL,ZESTRIL) 30 MG tablet, Take 1 tablet (30 mg total) by mouth every evening., Disp: 90 tablet, Rfl: 1    terazosin (HYTRIN) 2 MG capsule, Take 1 capsule (2 mg total) by mouth every evening., Disp: 90 capsule, Rfl: 1    timolol maleate 0.5% (TIMOPTIC) 0.5 % Drop, INSTILL 1 DROP INTO BOTH EYES IN THE MORNING, Disp: , Rfl:     travoprost (TRAVATAN Z) 0.004 % ophthalmic solution, Place 1 drop into both eyes nightly., Disp: , Rfl:            ROS  Twelve point system reviewed, unremarkable except for stated above in HPI.        Objective:         Vitals:    05/16/24 0841   BP: 133/76   BP Location: Right arm   Patient Position: Sitting   BP Method: Large (Automatic)   Pulse: 73   Resp: 18   Temp: 97.9 °F (36.6 °C)   TempSrc: Temporal   SpO2: 96%   Weight: 66.7 kg (147 lb)   Height: 5' 9" (1.753 m)        Physical Exam     Patient is awake alert oriented person place and  Lungs are clear to auscultation bilaterally no crackles or wheezes   Cardiovascular S1-S2 regular rate and rhythm no murmurs rubs or gallops   Abdomen is soft positive bowel sounds nontender, extremities no clubbing cyanosis " edema  Neuro no focal neurological deficits  Skin warm and dry.     Last Labs:     No visits with results within 1 Month(s) from this visit.   Latest known visit with results is:   Office Visit on 08/17/2022   Component Date Value    Triglycerides 08/17/2022 79     Cholesterol 08/17/2022 216 (H)     HDL Cholesterol 08/17/2022 66 (H)     Cholesterol/HDL Ratio (R* 08/17/2022 3.3     Non-HDL 08/17/2022 150     LDL Calculated 08/17/2022 134     LDL/HDL 08/17/2022 2.0     VLDL 08/17/2022 16        Last Imaging:  X-Ray Hip 2 or 3 views Right (with Pelvis when performed)  Narrative: EXAMINATION:  XR HIP WITH PELVIS WHEN PERFORMED, 2 OR 3  VIEWS RIGHT    CLINICAL HISTORY:  pain;    FINDINGS:  There is diffuse demineralization.  There is mild osteophyte and superior joint space loss in the right hip.  There are vascular calcifications present.  No acute right hip fracture is seen.  Impression: Demineralization and mild degenerative changes    Electronically signed by: Dee Fletcher  Date:    05/20/2022  Time:    10:58         **Labs and x-rays personally reviewed by me    ** reviewed           Assessment & Plan:       1. Essential (primary) hypertension      Blood pressure is stable continue medical management      Jabari Stafford MD

## 2024-05-31 ENCOUNTER — EXTERNAL CHRONIC CARE MANAGEMENT (OUTPATIENT)
Dept: FAMILY MEDICINE | Facility: CLINIC | Age: 89
End: 2024-05-31
Payer: MEDICARE

## 2024-05-31 PROCEDURE — G0511 CCM/BHI BY RHC/FQHC 20MIN MO: HCPCS | Mod: ,,, | Performed by: INTERNAL MEDICINE

## 2024-06-30 ENCOUNTER — EXTERNAL CHRONIC CARE MANAGEMENT (OUTPATIENT)
Dept: FAMILY MEDICINE | Facility: CLINIC | Age: 89
End: 2024-06-30
Payer: MEDICARE

## 2024-06-30 PROCEDURE — G0511 CCM/BHI BY RHC/FQHC 20MIN MO: HCPCS | Mod: ,,, | Performed by: INTERNAL MEDICINE

## 2024-07-31 ENCOUNTER — EXTERNAL CHRONIC CARE MANAGEMENT (OUTPATIENT)
Dept: FAMILY MEDICINE | Facility: CLINIC | Age: 89
End: 2024-07-31
Payer: MEDICARE

## 2024-07-31 PROCEDURE — G0511 CCM/BHI BY RHC/FQHC 20MIN MO: HCPCS | Mod: ,,, | Performed by: INTERNAL MEDICINE

## 2024-08-15 ENCOUNTER — OFFICE VISIT (OUTPATIENT)
Dept: FAMILY MEDICINE | Facility: CLINIC | Age: 89
End: 2024-08-15
Payer: MEDICARE

## 2024-08-15 VITALS
HEIGHT: 69 IN | SYSTOLIC BLOOD PRESSURE: 162 MMHG | OXYGEN SATURATION: 99 % | TEMPERATURE: 98 F | RESPIRATION RATE: 18 BRPM | BODY MASS INDEX: 22.07 KG/M2 | WEIGHT: 149 LBS | DIASTOLIC BLOOD PRESSURE: 83 MMHG | HEART RATE: 64 BPM

## 2024-08-15 DIAGNOSIS — I10 ESSENTIAL (PRIMARY) HYPERTENSION: Primary | ICD-10-CM

## 2024-08-15 PROCEDURE — 99213 OFFICE O/P EST LOW 20 MIN: CPT | Mod: ,,, | Performed by: INTERNAL MEDICINE

## 2024-08-15 RX ORDER — LISINOPRIL 40 MG/1
40 TABLET ORAL DAILY
Qty: 90 TABLET | Refills: 1 | Status: SHIPPED | OUTPATIENT
Start: 2024-08-15

## 2024-08-21 NOTE — PROGRESS NOTES
"Subjective:       Patient ID: Khoa Phan is a 89 y.o. male.    Chief Complaint: Hypertension (3 month fu htn ) and Health Maintenance (Pt is UTD on all care gaps )    HPI  .  Patient presents with a history of hypertension blood pressure is not at goal today patient denies chills chest pain shortness a breath today.    Current Medications:    Current Outpatient Medications:     acetaminophen-codeine 300-30mg (TYLENOL #3) 300-30 mg Tab, Take 1 tablet by mouth., Disp: , Rfl:     amLODIPine (NORVASC) 10 MG tablet, Take 1 tablet (10 mg total) by mouth once daily., Disp: 90 tablet, Rfl: 1    bicalutamide (CASODEX) 50 MG Tab, Take 1 tablet (50 mg total) by mouth once daily., Disp: 90 tablet, Rfl: 1    gabapentin (NEURONTIN) 100 MG capsule, Take 1 capsule (100 mg total) by mouth every evening., Disp: 30 capsule, Rfl: 2    hydroCHLOROthiazide (HYDRODIURIL) 25 MG tablet, Take 1 tablet (25 mg total) by mouth once daily., Disp: 90 tablet, Rfl: 1    terazosin (HYTRIN) 2 MG capsule, Take 1 capsule (2 mg total) by mouth every evening., Disp: 90 capsule, Rfl: 1    timolol maleate 0.5% (TIMOPTIC) 0.5 % Drop, INSTILL 1 DROP INTO BOTH EYES IN THE MORNING, Disp: , Rfl:     travoprost (TRAVATAN Z) 0.004 % ophthalmic solution, Place 1 drop into both eyes nightly., Disp: , Rfl:     lisinopriL (PRINIVIL,ZESTRIL) 40 MG tablet, Take 1 tablet (40 mg total) by mouth once daily., Disp: 90 tablet, Rfl: 1           ROS  Twelve point system reviewed, unremarkable except for stated above in HPI.        Objective:         Vitals:    08/15/24 0858 08/15/24 0904   BP: (!) 162/86 (!) 162/83   BP Location: Right arm    Patient Position: Sitting    BP Method: Large (Automatic)    Pulse: 64    Resp: 18    Temp: 97.5 °F (36.4 °C)    TempSrc: Tympanic    SpO2: 99%    Weight: 67.6 kg (149 lb)    Height: 5' 9" (1.753 m)         Physical Exam     Patient is awake alert oriented person place and  Lungs are clear to auscultation bilaterally no crackles " or wheezes   Cardiovascular S1-S2 regular rate and rhythm no murmurs rubs or gallops   Abdomen is soft positive bowel sounds nontender, extremities no clubbing cyanosis edema  Neuro no focal neurological deficits  Skin warm and dry.     Last Labs:     No visits with results within 1 Month(s) from this visit.   Latest known visit with results is:   Office Visit on 08/17/2022   Component Date Value    Triglycerides 08/17/2022 79     Cholesterol 08/17/2022 216 (H)     HDL Cholesterol 08/17/2022 66 (H)     Cholesterol/HDL Ratio (R* 08/17/2022 3.3     Non-HDL 08/17/2022 150     LDL Calculated 08/17/2022 134     LDL/HDL 08/17/2022 2.0     VLDL 08/17/2022 16        Last Imaging:  X-Ray Hip 2 or 3 views Right (with Pelvis when performed)  Narrative: EXAMINATION:  XR HIP WITH PELVIS WHEN PERFORMED, 2 OR 3  VIEWS RIGHT    CLINICAL HISTORY:  pain;    FINDINGS:  There is diffuse demineralization.  There is mild osteophyte and superior joint space loss in the right hip.  There are vascular calcifications present.  No acute right hip fracture is seen.  Impression: Demineralization and mild degenerative changes    Electronically signed by: Dee Fletcher  Date:    05/20/2022  Time:    10:58         **Labs and x-rays personally reviewed by me    ** reviewed           Assessment & Plan:       1. Essential (primary) hypertension    Other orders  -     lisinopriL (PRINIVIL,ZESTRIL) 40 MG tablet; Take 1 tablet (40 mg total) by mouth once daily.  Dispense: 90 tablet; Refill: 1            Jabari Stafford MD

## 2024-08-31 ENCOUNTER — EXTERNAL CHRONIC CARE MANAGEMENT (OUTPATIENT)
Dept: FAMILY MEDICINE | Facility: CLINIC | Age: 89
End: 2024-08-31
Payer: MEDICARE

## 2024-08-31 PROCEDURE — G0511 CCM/BHI BY RHC/FQHC 20MIN MO: HCPCS | Mod: ,,, | Performed by: INTERNAL MEDICINE

## 2024-09-05 ENCOUNTER — OFFICE VISIT (OUTPATIENT)
Dept: FAMILY MEDICINE | Facility: CLINIC | Age: 89
End: 2024-09-05
Payer: MEDICARE

## 2024-09-05 VITALS
WEIGHT: 151 LBS | OXYGEN SATURATION: 98 % | BODY MASS INDEX: 22.36 KG/M2 | SYSTOLIC BLOOD PRESSURE: 166 MMHG | HEIGHT: 69 IN | RESPIRATION RATE: 18 BRPM | TEMPERATURE: 97 F | DIASTOLIC BLOOD PRESSURE: 87 MMHG | HEART RATE: 76 BPM

## 2024-09-05 DIAGNOSIS — I10 ESSENTIAL (PRIMARY) HYPERTENSION: Primary | ICD-10-CM

## 2024-09-05 PROCEDURE — 99212 OFFICE O/P EST SF 10 MIN: CPT | Mod: ,,, | Performed by: INTERNAL MEDICINE

## 2024-09-11 NOTE — PROGRESS NOTES
"Subjective:       Patient ID: Khoa Phan is a 89 y.o. male.    Chief Complaint: Hypertension (3 week fu HTN ) and Health Maintenance (Pt refuses care gaps )    HPI  .  Patient seen evaluated distress no chest pain shortness a today.    Current Medications:    Current Outpatient Medications:     acetaminophen-codeine 300-30mg (TYLENOL #3) 300-30 mg Tab, Take 1 tablet by mouth., Disp: , Rfl:     amLODIPine (NORVASC) 10 MG tablet, Take 1 tablet (10 mg total) by mouth once daily., Disp: 90 tablet, Rfl: 1    bicalutamide (CASODEX) 50 MG Tab, Take 1 tablet (50 mg total) by mouth once daily., Disp: 90 tablet, Rfl: 1    gabapentin (NEURONTIN) 100 MG capsule, Take 1 capsule (100 mg total) by mouth every evening., Disp: 30 capsule, Rfl: 2    hydroCHLOROthiazide (HYDRODIURIL) 25 MG tablet, Take 1 tablet (25 mg total) by mouth once daily., Disp: 90 tablet, Rfl: 1    lisinopriL (PRINIVIL,ZESTRIL) 40 MG tablet, Take 1 tablet (40 mg total) by mouth once daily., Disp: 90 tablet, Rfl: 1    terazosin (HYTRIN) 2 MG capsule, Take 1 capsule (2 mg total) by mouth every evening., Disp: 90 capsule, Rfl: 1    timolol maleate 0.5% (TIMOPTIC) 0.5 % Drop, INSTILL 1 DROP INTO BOTH EYES IN THE MORNING, Disp: , Rfl:     travoprost (TRAVATAN Z) 0.004 % ophthalmic solution, Place 1 drop into both eyes nightly., Disp: , Rfl:            ROS  Twelve point system reviewed, unremarkable except for stated above in HPI.        Objective:         Vitals:    09/05/24 0900 09/05/24 1023   BP: (!) 166/89 (!) 166/87   BP Location: Right arm    Patient Position: Sitting    BP Method: Large (Automatic)    Pulse: 76    Resp: 18    Temp: 97.3 °F (36.3 °C)    TempSrc: Temporal    SpO2: 98%    Weight: 68.5 kg (151 lb)    Height: 5' 9" (1.753 m)         Physical Exam     Patient is awake alert oriented person place and  Lungs are clear to auscultation bilaterally no crackles or wheezes   Cardiovascular S1-S2 regular rate and rhythm no murmurs rubs or gallops "   Abdomen is soft positive bowel sounds nontender, extremities no clubbing cyanosis edema  Neuro no focal neurological deficits  Skin warm and dry.     Last Labs:     No visits with results within 1 Month(s) from this visit.   Latest known visit with results is:   Office Visit on 08/17/2022   Component Date Value    Triglycerides 08/17/2022 79     Cholesterol 08/17/2022 216 (H)     HDL Cholesterol 08/17/2022 66 (H)     Cholesterol/HDL Ratio (R* 08/17/2022 3.3     Non-HDL 08/17/2022 150     LDL Calculated 08/17/2022 134     LDL/HDL 08/17/2022 2.0     VLDL 08/17/2022 16        Last Imaging:  X-Ray Hip 2 or 3 views Right (with Pelvis when performed)  Narrative: EXAMINATION:  XR HIP WITH PELVIS WHEN PERFORMED, 2 OR 3  VIEWS RIGHT    CLINICAL HISTORY:  pain;    FINDINGS:  There is diffuse demineralization.  There is mild osteophyte and superior joint space loss in the right hip.  There are vascular calcifications present.  No acute right hip fracture is seen.  Impression: Demineralization and mild degenerative changes    Electronically signed by: Dee Fletcher  Date:    05/20/2022  Time:    10:58         **Labs and x-rays personally reviewed by me    ** reviewed           Assessment & Plan:       1. Essential (primary) hypertension      Blood pressure is relatively stable stated the blood pressure at home is 130s over 70s      Jabari Stafford MD

## 2024-09-30 ENCOUNTER — EXTERNAL CHRONIC CARE MANAGEMENT (OUTPATIENT)
Dept: FAMILY MEDICINE | Facility: CLINIC | Age: 89
End: 2024-09-30
Payer: MEDICARE

## 2024-09-30 PROCEDURE — G0511 CCM/BHI BY RHC/FQHC 20MIN MO: HCPCS | Mod: ,,, | Performed by: INTERNAL MEDICINE

## 2024-10-14 ENCOUNTER — OFFICE VISIT (OUTPATIENT)
Dept: FAMILY MEDICINE | Facility: CLINIC | Age: 89
End: 2024-10-14
Payer: MEDICARE

## 2024-10-14 VITALS
BODY MASS INDEX: 22.22 KG/M2 | TEMPERATURE: 97 F | OXYGEN SATURATION: 99 % | HEART RATE: 80 BPM | DIASTOLIC BLOOD PRESSURE: 84 MMHG | SYSTOLIC BLOOD PRESSURE: 155 MMHG | HEIGHT: 69 IN | RESPIRATION RATE: 18 BRPM | WEIGHT: 150 LBS

## 2024-10-14 DIAGNOSIS — R59.0 LOCALIZED ENLARGED LYMPH NODES: ICD-10-CM

## 2024-10-14 DIAGNOSIS — N63.20 MASS OF LEFT BREAST, UNSPECIFIED QUADRANT: Primary | ICD-10-CM

## 2024-10-14 PROCEDURE — 99214 OFFICE O/P EST MOD 30 MIN: CPT | Mod: ,,, | Performed by: INTERNAL MEDICINE

## 2024-10-14 RX ORDER — LISINOPRIL 40 MG/1
40 TABLET ORAL DAILY
Qty: 90 TABLET | Refills: 1 | Status: SHIPPED | OUTPATIENT
Start: 2024-10-14

## 2024-10-20 NOTE — PROGRESS NOTES
"Subjective:       Patient ID: Khoa Phan is a 89 y.o. male.    Chief Complaint: Mass (On chest ) and Health Maintenance (Pt refuses care gaps )    HPI  .  Patient presents to the clinic with the son complains of a left breast mass stated he has noticed that 4 week.  He also has chronic hypertension.  He has a palpable mass to the left upper quadrant of his breasts.    Current Medications:    Current Outpatient Medications:     acetaminophen-codeine 300-30mg (TYLENOL #3) 300-30 mg Tab, Take 1 tablet by mouth., Disp: , Rfl:     amLODIPine (NORVASC) 10 MG tablet, Take 1 tablet (10 mg total) by mouth once daily., Disp: 90 tablet, Rfl: 1    bicalutamide (CASODEX) 50 MG Tab, Take 1 tablet (50 mg total) by mouth once daily., Disp: 90 tablet, Rfl: 1    gabapentin (NEURONTIN) 100 MG capsule, Take 1 capsule (100 mg total) by mouth every evening., Disp: 30 capsule, Rfl: 2    hydroCHLOROthiazide (HYDRODIURIL) 25 MG tablet, Take 1 tablet (25 mg total) by mouth once daily., Disp: 90 tablet, Rfl: 1    terazosin (HYTRIN) 2 MG capsule, Take 1 capsule (2 mg total) by mouth every evening., Disp: 90 capsule, Rfl: 1    timolol maleate 0.5% (TIMOPTIC) 0.5 % Drop, INSTILL 1 DROP INTO BOTH EYES IN THE MORNING, Disp: , Rfl:     travoprost (TRAVATAN Z) 0.004 % ophthalmic solution, Place 1 drop into both eyes nightly., Disp: , Rfl:     lisinopriL (PRINIVIL,ZESTRIL) 40 MG tablet, Take 1 tablet (40 mg total) by mouth once daily., Disp: 90 tablet, Rfl: 1           ROS  Twelve point system reviewed, unremarkable except for stated above in HPI.        Objective:         Vitals:    10/14/24 1322 10/14/24 1353   BP: (!) 153/74 (!) 155/84   Pulse: 80    Resp: 18    Temp: 97.1 °F (36.2 °C)    TempSrc: Temporal    SpO2: 99%    Weight: 68 kg (150 lb)    Height: 5' 9" (1.753 m)         Physical Exam     Patient is awake alert oriented person place and  Lungs are clear to auscultation bilaterally no crackles or wheezes   Cardiovascular S1-S2 " regular rate and rhythm no murmurs rubs or gallops   Abdomen is soft positive bowel sounds nontender, extremities no clubbing cyanosis edema  Neuro no focal neurological deficits  Skin warm and dry.     Last Labs:     No visits with results within 1 Month(s) from this visit.   Latest known visit with results is:   Office Visit on 08/17/2022   Component Date Value    Triglycerides 08/17/2022 79     Cholesterol 08/17/2022 216 (H)     HDL Cholesterol 08/17/2022 66 (H)     Cholesterol/HDL Ratio (R* 08/17/2022 3.3     Non-HDL 08/17/2022 150     LDL Calculated 08/17/2022 134     LDL/HDL 08/17/2022 2.0     VLDL 08/17/2022 16        Last Imaging:  X-Ray Hip 2 or 3 views Right (with Pelvis when performed)  Narrative: EXAMINATION:  XR HIP WITH PELVIS WHEN PERFORMED, 2 OR 3  VIEWS RIGHT    CLINICAL HISTORY:  pain;    FINDINGS:  There is diffuse demineralization.  There is mild osteophyte and superior joint space loss in the right hip.  There are vascular calcifications present.  No acute right hip fracture is seen.  Impression: Demineralization and mild degenerative changes    Electronically signed by: Dee Fletcher  Date:    05/20/2022  Time:    10:58         **Labs and x-rays personally reviewed by me    ** reviewed           Assessment & Plan:       1. Mass of left breast, unspecified quadrant  -     US Breast Left Complete; Future; Expected date: 10/14/2024  -     Mammo Digital Diagnostic Left with Tj; Future; Expected date: 10/14/2024  -     Ambulatory referral/consult to General Surgery; Future; Expected date: 10/21/2024  -     Mammo Digital Diagnostic Left with Tj; Future; Expected date: 10/14/2024    Chronic hypertension  -     lisinopriL (PRINIVIL,ZESTRIL) 40 MG tablet; Take 1 tablet (40 mg total) by mouth once daily.  Dispense: 90 tablet; Refill: 1            Jabari Stafford MD

## 2024-10-22 RX ORDER — TERAZOSIN 2 MG/1
CAPSULE ORAL
Qty: 90 CAPSULE | Refills: 1 | Status: SHIPPED | OUTPATIENT
Start: 2024-10-22

## 2024-10-31 ENCOUNTER — EXTERNAL CHRONIC CARE MANAGEMENT (OUTPATIENT)
Dept: FAMILY MEDICINE | Facility: CLINIC | Age: 89
End: 2024-10-31
Payer: MEDICARE

## 2024-10-31 PROCEDURE — G0511 CCM/BHI BY RHC/FQHC 20MIN MO: HCPCS | Mod: ,,, | Performed by: INTERNAL MEDICINE

## 2024-11-03 RX ORDER — HYDROCHLOROTHIAZIDE 25 MG/1
25 TABLET ORAL
Qty: 90 TABLET | Refills: 1 | Status: SHIPPED | OUTPATIENT
Start: 2024-11-03 | End: 2024-11-04 | Stop reason: SDUPTHER

## 2024-11-04 ENCOUNTER — OFFICE VISIT (OUTPATIENT)
Dept: FAMILY MEDICINE | Facility: CLINIC | Age: 89
End: 2024-11-04
Payer: MEDICARE

## 2024-11-04 VITALS
HEIGHT: 69 IN | TEMPERATURE: 98 F | RESPIRATION RATE: 17 BRPM | HEART RATE: 79 BPM | SYSTOLIC BLOOD PRESSURE: 163 MMHG | DIASTOLIC BLOOD PRESSURE: 88 MMHG | OXYGEN SATURATION: 99 % | BODY MASS INDEX: 22.51 KG/M2 | WEIGHT: 152 LBS

## 2024-11-04 DIAGNOSIS — I10 ESSENTIAL (PRIMARY) HYPERTENSION: Primary | ICD-10-CM

## 2024-11-04 DIAGNOSIS — N40.0 BENIGN PROSTATIC HYPERPLASIA, UNSPECIFIED WHETHER LOWER URINARY TRACT SYMPTOMS PRESENT: ICD-10-CM

## 2024-11-04 DIAGNOSIS — N63.21 MASS OF UPPER OUTER QUADRANT OF LEFT BREAST: ICD-10-CM

## 2024-11-04 DIAGNOSIS — J45.909 ASTHMA, UNSPECIFIED ASTHMA SEVERITY, UNSPECIFIED WHETHER COMPLICATED, UNSPECIFIED WHETHER PERSISTENT: ICD-10-CM

## 2024-11-04 DIAGNOSIS — J30.1 NON-SEASONAL ALLERGIC RHINITIS DUE TO POLLEN: ICD-10-CM

## 2024-11-04 DIAGNOSIS — E11.9 TYPE 2 DIABETES MELLITUS WITHOUT COMPLICATION, WITHOUT LONG-TERM CURRENT USE OF INSULIN: ICD-10-CM

## 2024-11-04 PROCEDURE — 99214 OFFICE O/P EST MOD 30 MIN: CPT | Mod: ,,, | Performed by: INTERNAL MEDICINE

## 2024-11-04 RX ORDER — AMLODIPINE BESYLATE 10 MG/1
10 TABLET ORAL DAILY
Qty: 90 TABLET | Refills: 1 | Status: SHIPPED | OUTPATIENT
Start: 2024-11-04

## 2024-11-04 RX ORDER — HYDROCHLOROTHIAZIDE 25 MG/1
25 TABLET ORAL DAILY
Qty: 90 TABLET | Refills: 1 | Status: SHIPPED | OUTPATIENT
Start: 2024-11-04

## 2024-11-04 RX ORDER — BICALUTAMIDE 50 MG/1
50 TABLET, FILM COATED ORAL DAILY
Qty: 90 TABLET | Refills: 1 | Status: SHIPPED | OUTPATIENT
Start: 2024-11-04

## 2024-11-04 RX ORDER — GABAPENTIN 100 MG/1
100 CAPSULE ORAL NIGHTLY
Qty: 30 CAPSULE | Refills: 2 | Status: SHIPPED | OUTPATIENT
Start: 2024-11-04

## 2024-11-04 RX ORDER — TERAZOSIN 2 MG/1
CAPSULE ORAL
Qty: 90 CAPSULE | Refills: 1 | Status: SHIPPED | OUTPATIENT
Start: 2024-11-04

## 2024-11-04 NOTE — PROGRESS NOTES
Subjective:       Patient ID: Khoa Phan is a 89 y.o. male.    Chief Complaint: Breast Mass (3 week fu ) and Health Maintenance (Pt refuses care gaps )    HPI  .  Patient presents with several chronic medical problems.  He also has a breast mass.  We have ordered a mammogram and an ultrasound to be done at the imaging center.  However, imaging called the patient, but patient did not answer the phone.    We did get his son's phone number we did give him the number to the imaging center so he can call the imaging center.  He has a appointment set up for November 11.  Also going to give him a referral for surgery evaluation.    Current Medications:    Current Outpatient Medications:     acetaminophen-codeine 300-30mg (TYLENOL #3) 300-30 mg Tab, Take 1 tablet by mouth., Disp: , Rfl:     lisinopriL (PRINIVIL,ZESTRIL) 40 MG tablet, Take 1 tablet (40 mg total) by mouth once daily., Disp: 90 tablet, Rfl: 1    timolol maleate 0.5% (TIMOPTIC) 0.5 % Drop, INSTILL 1 DROP INTO BOTH EYES IN THE MORNING, Disp: , Rfl:     travoprost (TRAVATAN Z) 0.004 % ophthalmic solution, Place 1 drop into both eyes nightly., Disp: , Rfl:     amLODIPine (NORVASC) 10 MG tablet, Take 1 tablet (10 mg total) by mouth once daily., Disp: 90 tablet, Rfl: 1    bicalutamide (CASODEX) 50 MG Tab, Take 1 tablet (50 mg total) by mouth once daily., Disp: 90 tablet, Rfl: 1    gabapentin (NEURONTIN) 100 MG capsule, Take 1 capsule (100 mg total) by mouth every evening., Disp: 30 capsule, Rfl: 2    hydroCHLOROthiazide (HYDRODIURIL) 25 MG tablet, Take 1 tablet (25 mg total) by mouth once daily., Disp: 90 tablet, Rfl: 1    terazosin (HYTRIN) 2 MG capsule, TAKE 1 CAPSULE(2 MG) BY MOUTH EVERY EVENING, Disp: 90 capsule, Rfl: 1           ROS  Twelve point system reviewed, unremarkable except for stated above in HPI.        Objective:         Vitals:    11/04/24 0917 11/04/24 0938   BP: (!) 161/82 (!) 163/88   BP Location: Right arm    Patient Position: Sitting   "  Pulse: 79    Resp: 17    Temp: 97.8 °F (36.6 °C)    TempSrc: Tympanic    SpO2: 99%    Weight: 68.9 kg (152 lb)    Height: 5' 9" (1.753 m)         Physical Exam     Patient is awake alert oriented person place and  Lungs are clear to auscultation bilaterally no crackles or wheezes   Cardiovascular S1-S2 regular rate and rhythm no murmurs rubs or gallops   Abdomen is soft positive bowel sounds nontender, extremities no clubbing cyanosis edema  Neuro no focal neurological deficits  Skin warm and dry.     Last Labs:     No visits with results within 1 Month(s) from this visit.   Latest known visit with results is:   Office Visit on 08/17/2022   Component Date Value    Triglycerides 08/17/2022 79     Cholesterol 08/17/2022 216 (H)     HDL Cholesterol 08/17/2022 66 (H)     Cholesterol/HDL Ratio (R* 08/17/2022 3.3     Non-HDL 08/17/2022 150     LDL Calculated 08/17/2022 134     LDL/HDL 08/17/2022 2.0     VLDL 08/17/2022 16        Last Imaging:  X-Ray Hip 2 or 3 views Right (with Pelvis when performed)  Narrative: EXAMINATION:  XR HIP WITH PELVIS WHEN PERFORMED, 2 OR 3  VIEWS RIGHT    CLINICAL HISTORY:  pain;    FINDINGS:  There is diffuse demineralization.  There is mild osteophyte and superior joint space loss in the right hip.  There are vascular calcifications present.  No acute right hip fracture is seen.  Impression: Demineralization and mild degenerative changes    Electronically signed by: Dee Fletcher  Date:    05/20/2022  Time:    10:58         **Labs and x-rays personally reviewed by me    ** reviewed           Assessment & Plan:       1. Essential (primary) hypertension    2. Benign prostatic hyperplasia, unspecified whether lower urinary tract symptoms present    3. Asthma, unspecified asthma severity, unspecified whether complicated, unspecified whether persistent    4. Non-seasonal allergic rhinitis due to pollen    5. Type 2 diabetes mellitus without complication, without long-term current use of " insulin    6. Mass of upper outer quadrant of left breast  Refer to general surgery., awaiting, ultrasound and mammogram of the left breast.  Chronic hypertension   Chronic allergic rhinitis   Chronic neuropathy   Chronic BPH  -     amLODIPine (NORVASC) 10 MG tablet; Take 1 tablet (10 mg total) by mouth once daily.  Dispense: 90 tablet; Refill: 1  -     bicalutamide (CASODEX) 50 MG Tab; Take 1 tablet (50 mg total) by mouth once daily.  Dispense: 90 tablet; Refill: 1  -     gabapentin (NEURONTIN) 100 MG capsule; Take 1 capsule (100 mg total) by mouth every evening.  Dispense: 30 capsule; Refill: 2  -     hydroCHLOROthiazide (HYDRODIURIL) 25 MG tablet; Take 1 tablet (25 mg total) by mouth once daily.  Dispense: 90 tablet; Refill: 1  -     terazosin (HYTRIN) 2 MG capsule; TAKE 1 CAPSULE(2 MG) BY MOUTH EVERY EVENING  Dispense: 90 capsule; Refill: 1            Jabari Stafford MD

## 2024-11-06 ENCOUNTER — OFFICE VISIT (OUTPATIENT)
Dept: SURGERY | Facility: CLINIC | Age: 89
End: 2024-11-06
Attending: SURGERY
Payer: MEDICARE

## 2024-11-06 VITALS — HEIGHT: 69 IN | BODY MASS INDEX: 22.51 KG/M2 | WEIGHT: 152 LBS

## 2024-11-06 DIAGNOSIS — N63.20 MASS OF LEFT BREAST, UNSPECIFIED QUADRANT: Primary | ICD-10-CM

## 2024-11-06 PROCEDURE — 88305 TISSUE EXAM BY PATHOLOGIST: CPT | Mod: 26,,, | Performed by: PATHOLOGY

## 2024-11-06 PROCEDURE — 88305 TISSUE EXAM BY PATHOLOGIST: CPT | Mod: TC,SUR | Performed by: SURGERY

## 2024-11-06 PROCEDURE — 99499 UNLISTED E&M SERVICE: CPT | Mod: S$PBB,,, | Performed by: SURGERY

## 2024-11-06 PROCEDURE — 99213 OFFICE O/P EST LOW 20 MIN: CPT | Mod: PBBFAC | Performed by: SURGERY

## 2024-11-06 PROCEDURE — 99999 PR PBB SHADOW E&M-EST. PATIENT-LVL III: CPT | Mod: PBBFAC,,, | Performed by: SURGERY

## 2024-11-06 PROCEDURE — 19100 BX BREAST PERCUT W/O IMAGE: CPT | Mod: PBBFAC | Performed by: SURGERY

## 2024-11-06 NOTE — ASSESSMENT & PLAN NOTE
Performed core biopsy in the office today for diagnosis  Imaging is pending  Follow up on pathology and inform patient  Return to clinic in 1 week so we can make further plans regarding surgical management

## 2024-11-06 NOTE — PROGRESS NOTES
Subjective:           Patient ID: Khoa Phan is a 89 y.o. male.    Chief Complaint: Breast Problem (Left breast mass )      89-year-old male patient who takes Casodex since 2003 presents with recent discovery of a left breast mass.  The patient states he was looking for his cellphone in his shirt pocket when he palpated this mass.  He is to has been seen by his primary care physician who has referred him on to surgery and ordered imaging.  Patient denies any changes in medications.  He denies family history of breast cancer or any other masses, including axillary nodes.        family history includes Diabetes in his brother; Heart disease in his brother; Stroke in his brother.    Past Medical History:   Diagnosis Date    BPH (benign prostatic hyperplasia)     Hypertension         Current Outpatient Medications on File Prior to Visit   Medication Sig Dispense Refill    acetaminophen-codeine 300-30mg (TYLENOL #3) 300-30 mg Tab Take 1 tablet by mouth.      amLODIPine (NORVASC) 10 MG tablet Take 1 tablet (10 mg total) by mouth once daily. 90 tablet 1    bicalutamide (CASODEX) 50 MG Tab Take 1 tablet (50 mg total) by mouth once daily. 90 tablet 1    gabapentin (NEURONTIN) 100 MG capsule Take 1 capsule (100 mg total) by mouth every evening. 30 capsule 2    hydroCHLOROthiazide (HYDRODIURIL) 25 MG tablet Take 1 tablet (25 mg total) by mouth once daily. 90 tablet 1    lisinopriL (PRINIVIL,ZESTRIL) 40 MG tablet Take 1 tablet (40 mg total) by mouth once daily. 90 tablet 1    terazosin (HYTRIN) 2 MG capsule TAKE 1 CAPSULE(2 MG) BY MOUTH EVERY EVENING 90 capsule 1    timolol maleate 0.5% (TIMOPTIC) 0.5 % Drop INSTILL 1 DROP INTO BOTH EYES IN THE MORNING      travoprost (TRAVATAN Z) 0.004 % ophthalmic solution Place 1 drop into both eyes nightly.       No current facility-administered medications on file prior to visit.       Review of patient's allergies indicates:   Allergen Reactions    Alfuzosin      Other reaction(s):  "Unknown       Past Surgical History:   Procedure Laterality Date    HEMORRHOID SURGERY           reports that he has never smoked. He has never used smokeless tobacco. He reports that he does not drink alcohol and does not use drugs.       Review of Systems   All other systems reviewed and are negative.             Objective:      Ht 5' 9" (1.753 m)   Wt 68.9 kg (152 lb)   BMI 22.45 kg/m²    Physical Exam  Constitutional:       General: He is not in acute distress.     Appearance: He is normal weight.   HENT:      Nose: Nose normal.   Eyes:      General: No scleral icterus.  Cardiovascular:      Rate and Rhythm: Normal rate and regular rhythm.      Pulses: Normal pulses.      Heart sounds: Normal heart sounds.   Pulmonary:      Breath sounds: Normal breath sounds.   Chest:   Breasts:     Left: Mass (Very firm, located Slightly cephalad to the nipple, but extending deep to the nipple and measuring 5-1/2 cm x 7-1/2 cm and somewhat irregular borders.) present.   Abdominal:      General: There is no distension.      Palpations: Abdomen is soft. There is no mass.      Hernia: No hernia is present.   Musculoskeletal:         General: No swelling or tenderness.   Lymphadenopathy:      Cervical: No cervical adenopathy.   Neurological:      General: No focal deficit present.      Mental Status: He is alert.      Motor: No weakness.   Psychiatric:         Mood and Affect: Mood normal.           Assessment/Plan:     Problem List Items Addressed This Visit          Renal/    Mass of left breast - Primary    Overview     Discovered by patient recently, not imaged yet         Current Assessment & Plan     Performed core biopsy in the office today for diagnosis  Imaging is pending  Follow up on pathology and inform patient  Return to clinic in 1 week so we can make further plans regarding surgical management         Relevant Orders    Surgical Pathology             "

## 2024-11-06 NOTE — PROGRESS NOTES
Procedure:  Core biopsy of left breast mass    Patient brought to the clinic operating room and placed supine.  The left breast was prepped and draped standard fashion with Betadine.  Lidocaine was injected.  Fifteen blade was used to perform a small nick in the skin.  The core biopsy device was used to obtain 3 separate samples.  These were all sent to pathology.  Pressure was held for hemostasis.  Patient tolerated very well.

## 2024-11-08 LAB
ESTROGEN SERPL-MCNC: NORMAL PG/ML
INSULIN SERPL-ACNC: NORMAL U[IU]/ML
LAB AP CLINICAL INFORMATION: NORMAL
LAB AP GROSS DESCRIPTION: NORMAL
LAB AP LABORATORY NOTES: NORMAL
T3RU NFR SERPL: NORMAL %

## 2024-11-11 ENCOUNTER — HOSPITAL ENCOUNTER (OUTPATIENT)
Dept: RADIOLOGY | Facility: HOSPITAL | Age: 89
Discharge: HOME | End: 2024-11-11
Attending: INTERNAL MEDICINE
Payer: MEDICARE

## 2024-11-11 ENCOUNTER — TELEPHONE (OUTPATIENT)
Dept: FAMILY MEDICINE | Facility: CLINIC | Age: 89
End: 2024-11-11
Payer: MEDICARE

## 2024-11-11 DIAGNOSIS — N63.20 MASS OF LEFT BREAST, UNSPECIFIED QUADRANT: ICD-10-CM

## 2024-11-11 DIAGNOSIS — R59.0 LOCALIZED ENLARGED LYMPH NODES: ICD-10-CM

## 2024-11-11 PROCEDURE — 77066 DX MAMMO INCL CAD BI: CPT | Mod: 26,,, | Performed by: RADIOLOGY

## 2024-11-11 PROCEDURE — 77062 BREAST TOMOSYNTHESIS BI: CPT | Mod: TC

## 2024-11-11 PROCEDURE — 76641 ULTRASOUND BREAST COMPLETE: CPT | Mod: TC,50

## 2024-11-11 PROCEDURE — 76641 ULTRASOUND BREAST COMPLETE: CPT | Mod: 26,50,, | Performed by: RADIOLOGY

## 2024-11-11 PROCEDURE — 77062 BREAST TOMOSYNTHESIS BI: CPT | Mod: 26,,, | Performed by: RADIOLOGY

## 2024-11-11 NOTE — TELEPHONE ENCOUNTER
----- Message from Jabari Stafford MD sent at 11/11/2024 11:13 AM CST -----  Need to see in  1 week please  abnl results     7519 Pt is scheduled for 11/25/24

## 2024-11-12 ENCOUNTER — OFFICE VISIT (OUTPATIENT)
Dept: SURGERY | Facility: CLINIC | Age: 89
End: 2024-11-12
Attending: SURGERY
Payer: MEDICARE

## 2024-11-12 ENCOUNTER — CLINICAL SUPPORT (OUTPATIENT)
Dept: CARDIOLOGY | Facility: CLINIC | Age: 89
End: 2024-11-12
Attending: SURGERY
Payer: MEDICARE

## 2024-11-12 VITALS — HEIGHT: 69 IN | BODY MASS INDEX: 22.51 KG/M2 | WEIGHT: 152 LBS

## 2024-11-12 DIAGNOSIS — R94.120 ABNORMAL AUDITORY FUNCTION STUDY: ICD-10-CM

## 2024-11-12 DIAGNOSIS — N63.20 MASS OF LEFT BREAST, UNSPECIFIED QUADRANT: ICD-10-CM

## 2024-11-12 DIAGNOSIS — Z01.812 PRE-PROCEDURAL LABORATORY EXAMINATION: ICD-10-CM

## 2024-11-12 DIAGNOSIS — N63.20 MASS OF LEFT BREAST, UNSPECIFIED QUADRANT: Primary | ICD-10-CM

## 2024-11-12 LAB
OHS QRS DURATION: 86 MS
OHS QTC CALCULATION: 461 MS

## 2024-11-12 PROCEDURE — 99212 OFFICE O/P EST SF 10 MIN: CPT | Mod: PBBFAC,25

## 2024-11-12 PROCEDURE — 99999 PR PBB SHADOW E&M-EST. PATIENT-LVL IV: CPT | Mod: PBBFAC,,, | Performed by: SURGERY

## 2024-11-12 PROCEDURE — 99999 PR PBB SHADOW E&M-EST. PATIENT-LVL II: CPT | Mod: PBBFAC,,,

## 2024-11-12 PROCEDURE — 99214 OFFICE O/P EST MOD 30 MIN: CPT | Mod: PBBFAC | Performed by: SURGERY

## 2024-11-12 PROCEDURE — 93010 ELECTROCARDIOGRAM REPORT: CPT | Mod: S$PBB,,, | Performed by: STUDENT IN AN ORGANIZED HEALTH CARE EDUCATION/TRAINING PROGRAM

## 2024-11-12 PROCEDURE — 93005 ELECTROCARDIOGRAM TRACING: CPT | Mod: PBBFAC | Performed by: STUDENT IN AN ORGANIZED HEALTH CARE EDUCATION/TRAINING PROGRAM

## 2024-11-12 PROCEDURE — 99214 OFFICE O/P EST MOD 30 MIN: CPT | Mod: S$PBB,,, | Performed by: SURGERY

## 2024-11-12 NOTE — ASSESSMENT & PLAN NOTE
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  Due to the size, excision was recommended.  Risks and benefits include infection, bleeding, seroma, hematoma.  Patient understands and wishes to proceed.

## 2024-11-12 NOTE — H&P (VIEW-ONLY)
Subjective:           Patient ID: Khoa Phan is a 89 y.o. male.    Chief Complaint: Post-op Evaluation (Breat bx)      89-year-old male patient who takes Casodex since 2003 presents with recent discovery of a left breast mass.  The patient states he was looking for his cellphone in his shirt pocket when he palpated this mass.  He is to has been seen by his primary care physician who has referred him on to surgery and ordered imaging.  Patient denies any changes in medications.  He denies family history of breast cancer or any other masses, including axillary nodes.    9/12 Patient returns for biopsy results.  He had a benign fibroadenoma.  Ultrasound revealed an additional fibroadenoma, 9 o'clock position.        family history includes Diabetes in his brother; Heart disease in his brother; Stroke in his brother.    Past Medical History:   Diagnosis Date    BPH (benign prostatic hyperplasia)     Hypertension         Current Outpatient Medications on File Prior to Visit   Medication Sig Dispense Refill    acetaminophen-codeine 300-30mg (TYLENOL #3) 300-30 mg Tab Take 1 tablet by mouth.      amLODIPine (NORVASC) 10 MG tablet Take 1 tablet (10 mg total) by mouth once daily. 90 tablet 1    bicalutamide (CASODEX) 50 MG Tab Take 1 tablet (50 mg total) by mouth once daily. 90 tablet 1    gabapentin (NEURONTIN) 100 MG capsule Take 1 capsule (100 mg total) by mouth every evening. 30 capsule 2    hydroCHLOROthiazide (HYDRODIURIL) 25 MG tablet Take 1 tablet (25 mg total) by mouth once daily. 90 tablet 1    lisinopriL (PRINIVIL,ZESTRIL) 40 MG tablet Take 1 tablet (40 mg total) by mouth once daily. 90 tablet 1    terazosin (HYTRIN) 2 MG capsule TAKE 1 CAPSULE(2 MG) BY MOUTH EVERY EVENING 90 capsule 1    timolol maleate 0.5% (TIMOPTIC) 0.5 % Drop INSTILL 1 DROP INTO BOTH EYES IN THE MORNING      travoprost (TRAVATAN Z) 0.004 % ophthalmic solution Place 1 drop into both eyes nightly.       No current facility-administered  "medications on file prior to visit.       Review of patient's allergies indicates:   Allergen Reactions    Alfuzosin      Other reaction(s): Unknown       Past Surgical History:   Procedure Laterality Date    HEMORRHOID SURGERY           reports that he has never smoked. He has never used smokeless tobacco. He reports that he does not drink alcohol and does not use drugs.       Review of Systems   All other systems reviewed and are negative.             Objective:      Ht 5' 9" (1.753 m)   Wt 68.9 kg (152 lb)   BMI 22.45 kg/m²    Physical Exam  Constitutional:       General: He is not in acute distress.     Appearance: He is normal weight.   HENT:      Nose: Nose normal.   Eyes:      General: No scleral icterus.  Cardiovascular:      Rate and Rhythm: Normal rate and regular rhythm.      Pulses: Normal pulses.      Heart sounds: Normal heart sounds.   Pulmonary:      Breath sounds: Normal breath sounds.   Chest:   Breasts:     Left: Mass (Very firm, located Slightly cephalad to the nipple, but extending deep to the nipple and measuring 5-1/2 cm x 7-1/2 cm and somewhat irregular borders.) present.      Comments: Additional ovoid mass indicated by ultrasound is not palpable, but there is a small nodule in the 6 o'clock position subareolar or slightly caudad to the nipple border.  Abdominal:      General: There is no distension.      Palpations: Abdomen is soft. There is no mass.      Hernia: No hernia is present.   Musculoskeletal:         General: No swelling or tenderness.   Lymphadenopathy:      Cervical: No cervical adenopathy.   Neurological:      General: No focal deficit present.      Mental Status: He is alert.      Motor: No weakness.   Psychiatric:         Mood and Affect: Mood normal.           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  Due to the size, excision was recommended.  Risks and benefits include infection, bleeding, seroma, hematoma.  Patient understands and wishes to proceed.

## 2024-11-12 NOTE — PATIENT INSTRUCTIONS
[facility]  Instructions for surgery        Your surgery is scheduled for 11/20/24 at [facility] on the 1st floor of [facility].Your arrival time is 0600 a.m   You will be notified the day before  surgery verifying your arrival time for surgery.    Your preop lab work will be done today. Lab is on the 1st floor of the clinic. EKG is on 2nd floor of the clinic.                                                                                                                                                                                                                                                                                                                                                                           Day of Surgery Instructions      Bring a list of all your medications with you the day of your surgery. You can also give the list to your doctor or nurse during your final clinic appointment before surgery.      Do not eat any solid foods or drink any liquids after 12:00 AM (midnight). This includes gum, hard candy, mints, and chewing tobacco.  Medications: Take any medications specified with a small sip of water the morning of your surgery.  Brush your teeth: You may brush your teeth and rinse your mouth. Do not swallow any water or toothpaste.  Clothing: A button front shirt and loose-fitting clothes are the most comfortable before and after surgery. We also recommend low-heeled shoes.  Hair: Avoid buns, ponytails, or hairpieces at the back of the head. Remove or avoid any clips, pins or bands that bind hair. Do not use hairspray. Before going to surgery, you will need to remove any wigs or hairpieces.  We will cover your hair during surgery. Your privacy regarding personal appearance will be respected.  Fingernails: Please be sure to remove all nail polish before you arrive for surgery. We understand that tips, wraps, gels, etc., are  expensive; however, we ask these products to be removed from at least one finger on each hand. Your fingertips are used to accurately monitor your oxygen level during surgery by a device called an oximeter.  Glasses and Contact Lenses: Wear glasses when possible. If contact lenses must be worn, bring a lens case and solution. If glasses are worn, bring a case for them.  Hearing Aids: If you rely on a hearing aid, wear it to the hospital on the day of surgery. This will ensure you can hear and understand everything we need to communicate with you.  Valuables: Jewelry, including body piercings, Dentures, money, and credit cards should be left at home. [facility] is not responsible for valuables that are not secured in our surgery center.  Makeup, Perfume, Creams, Lotions and Deodorants: Do not use any of these products on the day of surgery. Remove false eyelashes prior to surgery.  Implanted Medical Devices: If you have an implanted device, such as a pacemaker or AICD, bring the device information card (if you have it) with you.  Medical Equipment: If you have been fitted for a brace to wear after surgery or you have been given crutches, bring those with you to the surgery center.  Shower: Take a shower with Hibiclens® (chlorhexidine) (available over the counter). This reduces the chance of infection. PLEASE USE CHLORHEXIDINE WASH THE NIGHT BEFORE SURGERY AND THE MORNING OF SURGERY.  ONLY 2 VISITORS ARE ALLOWED WITH YOU ON DAY OF SURGERY.        Medication instructions:  You may take blood pressure medication with a small drink of water the morning of surgery.        IF YOU ARE ON ANY OF THESE BLOOD THINNERS, MAKE SURE YOUR PHYSICIAN IS AWARE.  Eliquis/Apixaban            Wafarin/Coumadin,Jantoven  Xarelto/Rivaroxaban      Pletal/Cilostazol  Plavix/Clopidogrel          Pradaxa/Dibigatran      If you are diabetic      Follow the diabetic medicine instructions you received during your pre-operative visit.  DO NOT take  your insulin or diabetic medications the morning of surgery.  When you arrive at the surgical center, be sure to tell the nurse you are diabetic.    The following blood sugar medications have to be stopped prior to surgery:    Hold 24 hours prior to surgery:    Libraglutide - Saxenda, Victoza  Lixisenatide --Adlxyin  Exenatide  --  Byetta  Empaglifozin--Jardiance  Sitaglitin--Januvia    Hold 1 week prior to surgery:    Semaglutide - Ozempic, Wegouy, Rybelsus  Dulaglutide - Trulicity  Tirzepatide - Mounjaro  Exenatide (extended release inj)-- Bydureon BCise      Hold 48 hours prior to surgery:    Metformin, Glucovance, Metaglip, Fortamet, Glucophage, Riomet, Avandamet, Glimepiride            Other Items to bring with you and know    Insurance card  Identification card such as 's license, passport, or other picture ID  Copy of your advance directives  List of medications and allergies, if not already provided  Name and phone number of person to contact if your condition changes significantly. YOU CANNOT DRIVE YOURSELF HOME FROM THE HOSPITAL THE DAY OF SURGERY.  PLEASE UNDERSTAND THAT OUR OFFICE DOES NOT GIVE PATHOLOGY RESULTS OR TEST RESULTS OVER THE PHONE. THIS WILL BE DISCUSSED WITH YOU ON YOUR FOLLOW UP APPOINTMENT.  IF YOU SUBMIT FMLA FORMS, IT WILL TAKE 3-7 DAYS TO COMPLETE THESE          Alcohol and Surgery  We want to help you prepare for and recover from surgery as quickly and safely as possible. Be open and honest with your provider about how many drinks you have per day. Excessive alcohol use is defined as drinking more than three drinks per day. It can affect the outcome of your surgery. Binge drinking (consuming large amounts of alcohol infrequently, such as on weekends) can also affect the outcome of your surgery.  Alcohol withdrawal  If you drink more than three drinks a day, you could have a complication, called alcohol withdrawal, after surgery.  Alcohol withdrawal is a set of symptoms that  people have when they suddenly stop drinking after using alcohol  for a long time. During withdrawal, a person's central nervous system overreacts. This can cause mild symptoms such as shakiness, sweating or hallucinating. It can also cause other more serious side effects. If not treated properly, alcohol withdrawal can cause potentially life-threatening complications after surgery. This can include tremors, seizures, hallucinations, delirium tremors, and even death. Untreated alcohol withdrawal often leads to a longer stay in the hospital, potentially in the Intensive Care Unit.  Chronic heavy drinking also can interfere with several organ systems and biochemical processes in the body.  This interference can cause serious, even life-threatening complications.  Your care team can offer alcohol withdrawal treatment to help:  Decrease the risk of seizures and delirium tremors after surgery  Decrease the risk we will need to restrain you for your own safety or the safety of others  Decrease your risk of falling after surgery  Reduce the use of potent sedative medications  Reduce the time you stay in the hospital after surgery  Reduce the time you might spend on a mechanical ventilator to help you breathe  Lower incidence of organ failure and biochemical complications  Talk to a member of your care team or your primary care physician about your alcohol use if you feel you may be at risk of any of these complications.        Smoking and Surgery  Quitting smoking is extremely important for a successful surgery and recovery. Cigarette smoking compromises your immune system. This increases your risk of an infection after surgery. Quitting the habit before surgery will decrease the surgical risks associated with smoking.

## 2024-11-20 ENCOUNTER — HOSPITAL ENCOUNTER (OUTPATIENT)
Facility: HOSPITAL | Age: 89
Discharge: HOME | End: 2024-11-20
Attending: SURGERY | Admitting: SURGERY
Payer: MEDICARE

## 2024-11-20 ENCOUNTER — ANESTHESIA (OUTPATIENT)
Dept: SURGERY | Facility: HOSPITAL | Age: 89
End: 2024-11-20
Payer: MEDICARE

## 2024-11-20 ENCOUNTER — ANESTHESIA EVENT (OUTPATIENT)
Dept: SURGERY | Facility: HOSPITAL | Age: 89
End: 2024-11-20
Payer: MEDICARE

## 2024-11-20 VITALS
DIASTOLIC BLOOD PRESSURE: 102 MMHG | BODY MASS INDEX: 22.22 KG/M2 | HEART RATE: 81 BPM | RESPIRATION RATE: 16 BRPM | SYSTOLIC BLOOD PRESSURE: 171 MMHG | WEIGHT: 150 LBS | TEMPERATURE: 97 F | OXYGEN SATURATION: 96 % | HEIGHT: 69 IN

## 2024-11-20 DIAGNOSIS — N63.20 MASS OF LEFT BREAST, UNSPECIFIED QUADRANT: Primary | ICD-10-CM

## 2024-11-20 PROCEDURE — 37000009 HC ANESTHESIA EA ADD 15 MINS: Performed by: SURGERY

## 2024-11-20 PROCEDURE — 63600175 PHARM REV CODE 636 W HCPCS: Performed by: ANESTHESIOLOGY

## 2024-11-20 PROCEDURE — 88305 TISSUE EXAM BY PATHOLOGIST: CPT | Mod: TC,91,SUR | Performed by: SURGERY

## 2024-11-20 PROCEDURE — 27000716 HC OXISENSOR PROBE, ANY SIZE: Performed by: ANESTHESIOLOGY

## 2024-11-20 PROCEDURE — 71000033 HC RECOVERY, INTIAL HOUR: Performed by: SURGERY

## 2024-11-20 PROCEDURE — 27000177 HC AIRWAY, LARYNGEAL MASK: Performed by: ANESTHESIOLOGY

## 2024-11-20 PROCEDURE — 63600175 PHARM REV CODE 636 W HCPCS: Performed by: NURSE ANESTHETIST, CERTIFIED REGISTERED

## 2024-11-20 PROCEDURE — 63600175 PHARM REV CODE 636 W HCPCS: Mod: JZ,JG | Performed by: SURGERY

## 2024-11-20 PROCEDURE — 25000003 PHARM REV CODE 250: Performed by: NURSE ANESTHETIST, CERTIFIED REGISTERED

## 2024-11-20 PROCEDURE — C1729 CATH, DRAINAGE: HCPCS | Performed by: SURGERY

## 2024-11-20 PROCEDURE — 27000655: Performed by: ANESTHESIOLOGY

## 2024-11-20 PROCEDURE — 71000015 HC POSTOP RECOV 1ST HR: Performed by: SURGERY

## 2024-11-20 PROCEDURE — 36000706: Performed by: SURGERY

## 2024-11-20 PROCEDURE — 37000008 HC ANESTHESIA 1ST 15 MINUTES: Performed by: SURGERY

## 2024-11-20 PROCEDURE — 19120 REMOVAL OF BREAST LESION: CPT | Mod: ,,, | Performed by: SURGERY

## 2024-11-20 PROCEDURE — 88305 TISSUE EXAM BY PATHOLOGIST: CPT | Mod: 26,,, | Performed by: PATHOLOGY

## 2024-11-20 PROCEDURE — 36000707: Performed by: SURGERY

## 2024-11-20 PROCEDURE — 71000039 HC RECOVERY, EACH ADD'L HOUR: Performed by: SURGERY

## 2024-11-20 RX ORDER — ONDANSETRON HYDROCHLORIDE 2 MG/ML
4 INJECTION, SOLUTION INTRAVENOUS DAILY PRN
Status: DISCONTINUED | OUTPATIENT
Start: 2024-11-20 | End: 2024-11-20 | Stop reason: HOSPADM

## 2024-11-20 RX ORDER — ONDANSETRON 4 MG/1
8 TABLET, ORALLY DISINTEGRATING ORAL EVERY 8 HOURS PRN
Status: DISCONTINUED | OUTPATIENT
Start: 2024-11-20 | End: 2024-11-20 | Stop reason: HOSPADM

## 2024-11-20 RX ORDER — MORPHINE SULFATE 10 MG/ML
4 INJECTION INTRAMUSCULAR; INTRAVENOUS; SUBCUTANEOUS ONCE
Status: DISCONTINUED | OUTPATIENT
Start: 2024-11-20 | End: 2024-11-20 | Stop reason: HOSPADM

## 2024-11-20 RX ORDER — HYDROCODONE BITARTRATE AND ACETAMINOPHEN 7.5; 325 MG/1; MG/1
1 TABLET ORAL EVERY 6 HOURS PRN
Qty: 24 TABLET | Refills: 0 | Status: SHIPPED | OUTPATIENT
Start: 2024-11-20

## 2024-11-20 RX ORDER — BUPIVACAINE HYDROCHLORIDE 2.5 MG/ML
INJECTION, SOLUTION EPIDURAL; INFILTRATION; INTRACAUDAL
Status: DISCONTINUED | OUTPATIENT
Start: 2024-11-20 | End: 2024-11-20 | Stop reason: HOSPADM

## 2024-11-20 RX ORDER — LIDOCAINE HYDROCHLORIDE 20 MG/ML
INJECTION, SOLUTION EPIDURAL; INFILTRATION; INTRACAUDAL; PERINEURAL
Status: DISCONTINUED | OUTPATIENT
Start: 2024-11-20 | End: 2024-11-20

## 2024-11-20 RX ORDER — ONDANSETRON HYDROCHLORIDE 2 MG/ML
INJECTION, SOLUTION INTRAVENOUS
Status: DISCONTINUED | OUTPATIENT
Start: 2024-11-20 | End: 2024-11-20

## 2024-11-20 RX ORDER — DEXAMETHASONE SODIUM PHOSPHATE 4 MG/ML
INJECTION, SOLUTION INTRA-ARTICULAR; INTRALESIONAL; INTRAMUSCULAR; INTRAVENOUS; SOFT TISSUE
Status: DISCONTINUED | OUTPATIENT
Start: 2024-11-20 | End: 2024-11-20

## 2024-11-20 RX ORDER — IPRATROPIUM BROMIDE AND ALBUTEROL SULFATE 2.5; .5 MG/3ML; MG/3ML
3 SOLUTION RESPIRATORY (INHALATION)
Status: DISCONTINUED | OUTPATIENT
Start: 2024-11-20 | End: 2024-11-20 | Stop reason: HOSPADM

## 2024-11-20 RX ORDER — HYDROMORPHONE HYDROCHLORIDE 2 MG/ML
0.5 INJECTION, SOLUTION INTRAMUSCULAR; INTRAVENOUS; SUBCUTANEOUS EVERY 5 MIN PRN
Status: DISCONTINUED | OUTPATIENT
Start: 2024-11-20 | End: 2024-11-20 | Stop reason: HOSPADM

## 2024-11-20 RX ORDER — GLUCAGON 1 MG
1 KIT INJECTION
Status: DISCONTINUED | OUTPATIENT
Start: 2024-11-20 | End: 2024-11-20 | Stop reason: HOSPADM

## 2024-11-20 RX ORDER — IBUPROFEN 600 MG/1
600 TABLET ORAL EVERY 6 HOURS PRN
Status: DISCONTINUED | OUTPATIENT
Start: 2024-11-20 | End: 2024-11-20 | Stop reason: HOSPADM

## 2024-11-20 RX ORDER — MEPERIDINE HYDROCHLORIDE 25 MG/ML
25 INJECTION INTRAMUSCULAR; INTRAVENOUS; SUBCUTANEOUS EVERY 10 MIN PRN
Status: DISCONTINUED | OUTPATIENT
Start: 2024-11-20 | End: 2024-11-20 | Stop reason: HOSPADM

## 2024-11-20 RX ORDER — FENTANYL CITRATE 50 UG/ML
INJECTION, SOLUTION INTRAMUSCULAR; INTRAVENOUS
Status: DISCONTINUED | OUTPATIENT
Start: 2024-11-20 | End: 2024-11-20

## 2024-11-20 RX ORDER — DIPHENHYDRAMINE HYDROCHLORIDE 50 MG/ML
25 INJECTION INTRAMUSCULAR; INTRAVENOUS EVERY 6 HOURS PRN
Status: DISCONTINUED | OUTPATIENT
Start: 2024-11-20 | End: 2024-11-20 | Stop reason: HOSPADM

## 2024-11-20 RX ORDER — HYDROCODONE BITARTRATE AND ACETAMINOPHEN 10; 325 MG/1; MG/1
1 TABLET ORAL EVERY 4 HOURS PRN
Status: DISCONTINUED | OUTPATIENT
Start: 2024-11-20 | End: 2024-11-20 | Stop reason: HOSPADM

## 2024-11-20 RX ORDER — EPHEDRINE SULFATE 50 MG/ML
INJECTION, SOLUTION INTRAVENOUS
Status: DISCONTINUED | OUTPATIENT
Start: 2024-11-20 | End: 2024-11-20

## 2024-11-20 RX ORDER — PROPOFOL 10 MG/ML
VIAL (ML) INTRAVENOUS
Status: DISCONTINUED | OUTPATIENT
Start: 2024-11-20 | End: 2024-11-20

## 2024-11-20 RX ORDER — OXYCODONE HYDROCHLORIDE 5 MG/1
5 TABLET ORAL
Status: DISCONTINUED | OUTPATIENT
Start: 2024-11-20 | End: 2024-11-20 | Stop reason: HOSPADM

## 2024-11-20 RX ORDER — HYDRALAZINE HYDROCHLORIDE 20 MG/ML
10 INJECTION INTRAMUSCULAR; INTRAVENOUS ONCE
Status: COMPLETED | OUTPATIENT
Start: 2024-11-20 | End: 2024-11-20

## 2024-11-20 RX ORDER — MORPHINE SULFATE 10 MG/ML
4 INJECTION INTRAMUSCULAR; INTRAVENOUS; SUBCUTANEOUS EVERY 5 MIN PRN
Status: DISCONTINUED | OUTPATIENT
Start: 2024-11-20 | End: 2024-11-20 | Stop reason: HOSPADM

## 2024-11-20 RX ORDER — CEFAZOLIN SODIUM 1 G/3ML
INJECTION, POWDER, FOR SOLUTION INTRAMUSCULAR; INTRAVENOUS
Status: DISCONTINUED | OUTPATIENT
Start: 2024-11-20 | End: 2024-11-20

## 2024-11-20 RX ORDER — SODIUM CHLORIDE, SODIUM LACTATE, POTASSIUM CHLORIDE, CALCIUM CHLORIDE 600; 310; 30; 20 MG/100ML; MG/100ML; MG/100ML; MG/100ML
125 INJECTION, SOLUTION INTRAVENOUS CONTINUOUS
Status: DISCONTINUED | OUTPATIENT
Start: 2024-11-20 | End: 2024-11-20 | Stop reason: HOSPADM

## 2024-11-20 RX ADMIN — LIDOCAINE HYDROCHLORIDE 40 MG: 20 INJECTION, SOLUTION EPIDURAL; INFILTRATION; INTRACAUDAL; PERINEURAL at 11:11

## 2024-11-20 RX ADMIN — EPHEDRINE SULFATE 20 MG: 50 INJECTION INTRAVENOUS at 11:11

## 2024-11-20 RX ADMIN — HYDRALAZINE HYDROCHLORIDE 10 MG: 20 INJECTION INTRAMUSCULAR; INTRAVENOUS at 01:11

## 2024-11-20 RX ADMIN — ONDANSETRON 4 MG: 2 INJECTION INTRAMUSCULAR; INTRAVENOUS at 11:11

## 2024-11-20 RX ADMIN — SODIUM CHLORIDE, POTASSIUM CHLORIDE, SODIUM LACTATE AND CALCIUM CHLORIDE: 600; 310; 30; 20 INJECTION, SOLUTION INTRAVENOUS at 10:11

## 2024-11-20 RX ADMIN — EPHEDRINE SULFATE 10 MG: 50 INJECTION INTRAVENOUS at 12:11

## 2024-11-20 RX ADMIN — PROPOFOL 125 MG: 10 INJECTION, EMULSION INTRAVENOUS at 11:11

## 2024-11-20 RX ADMIN — FENTANYL CITRATE 50 MCG: 50 INJECTION, SOLUTION INTRAMUSCULAR; INTRAVENOUS at 10:11

## 2024-11-20 RX ADMIN — CEFAZOLIN 2 G: 1 INJECTION, POWDER, FOR SOLUTION INTRAMUSCULAR; INTRAVENOUS; PARENTERAL at 11:11

## 2024-11-20 RX ADMIN — FENTANYL CITRATE 50 MCG: 50 INJECTION, SOLUTION INTRAMUSCULAR; INTRAVENOUS at 11:11

## 2024-11-20 RX ADMIN — DEXAMETHASONE SODIUM PHOSPHATE 4 MG: 4 INJECTION, SOLUTION INTRA-ARTICULAR; INTRALESIONAL; INTRAMUSCULAR; INTRAVENOUS; SOFT TISSUE at 11:11

## 2024-11-20 RX ADMIN — EPHEDRINE SULFATE 10 MG: 50 INJECTION INTRAVENOUS at 11:11

## 2024-11-20 NOTE — TRANSFER OF CARE
"Anesthesia Transfer of Care Note    Patient: Khoa Phan    Procedure(s) Performed: Procedure(s) (LRB):  EXCISION, MASS, BREAST (Left)    Patient location: PACU    Anesthesia Type: general    Transport from OR: Transported from OR on 6-10 L/min O2 by face mask with adequate spontaneous ventilation    Post pain: adequate analgesia    Post assessment: no apparent anesthetic complications    Post vital signs: stable    Level of consciousness: awake, alert and oriented    Nausea/Vomiting: no nausea/vomiting    Complications: none    Transfer of care protocol was followed      Last vitals: Visit Vitals  BP (!) 161/92   Pulse 74   Temp 36.3 °C (97.4 °F)   Resp 10   Ht 5' 9" (1.753 m)   Wt 68 kg (150 lb)   SpO2 100%   BMI 22.15 kg/m²     "

## 2024-11-20 NOTE — ANESTHESIA POSTPROCEDURE EVALUATION
Anesthesia Post Evaluation    Patient: Khoa Phan    Procedure(s) Performed: Procedure(s) (LRB):  EXCISION, MASS, BREAST (Left)    Final Anesthesia Type: general      Patient location during evaluation: PACU  Patient participation: Yes- Able to Participate  Level of consciousness: awake and sedated  Post-procedure vital signs: reviewed and stable  Pain management: adequate  Airway patency: patent    PONV status at discharge: No PONV  Anesthetic complications: no      Cardiovascular status: blood pressure returned to baseline  Respiratory status: unassisted  Hydration status: euvolemic  Follow-up not needed.              Vitals Value Taken Time   /96 11/20/24 1239   Temp 36.3 °C (97.4 °F) 11/20/24 1224   Pulse 68 11/20/24 1240   Resp 12 11/20/24 1240   SpO2 100 % 11/20/24 1240   Vitals shown include unfiled device data.      No case tracking events are documented in the log.      Pain/Rob Score: Rob Score: 8 (11/20/2024 12:25 PM)

## 2024-11-20 NOTE — ANESTHESIA PROCEDURE NOTES
Intubation    Date/Time: 11/20/2024 11:03 AM    Performed by: Albina Aldridge CRNA  Authorized by: Jeff Mehta MD    Intubation:     Induction:  Intravenous    Intubated:  Postinduction    Mask Ventilation:  Not attempted    Attempts:  1    Attempted By:  CRNA    Method of Intubation:  Other (see comments)    Difficult Airway Encountered?: No      Complications:  None    Airway Device:  Supraglottic airway/LMA    Airway Device Size:  4.0    Style/Cuff Inflation:  Cuffed (inflated to minimal occlusive pressure)    Placement Verified By:  Capnometry    Complicating Factors:  None    Findings Post-Intubation:  BS equal bilateral and atraumatic/condition of teeth unchanged

## 2024-11-20 NOTE — OP NOTE
[facility]     Operative Note    SUMMARY     Date of Procedure: 11/20/2024     Procedure: Procedure(s) (LRB):  EXCISION, MASS, BREAST (Left)     Surgeons and Role:     * Jarrett Carrillo MD - Primary    Assisting Surgeon: None    Pre-Operative Diagnosis: Mass of left breast, unspecified quadrant [N63.20]    Post-Operative Diagnosis: Post-Op Diagnosis Codes:     * Mass of left breast, unspecified quadrant [N63.20]    Anesthesia: Local MAC    Technical Procedures Used:  Patient was brought to the operating room and placed supine.  General anesthesia was administered.  The left breast was prepped and draped standard fashion.  Curvilinear incision was performed overlying the palpable mass upper aspect left breast.  Dissection was carried down through subcutaneous tissue cautery.  Fibroadenoma was then excised using cautery.  Subsequent to this more medial and caudad dissection was performed to remove fibrous breast tissue containing and small nodule.  Subsequent to this, decision was made to dissect further laterally, to complete removal of fibrous tissue for more symmetrical appearance.  There was good hemostasis.  A drain was placed through a separate stab incision.  The wound was then closed using interrupted Vicryl to reapproximate deep dermis followed by continuous running 4-0 Vicryl to reapproximate skin subcuticular position.  Steri-Strips were placed.  A drain stitch was placed using nylon.  Bulb was connected to the drain.  Patient was awakened from anesthesia in stable condition.    Description of the Findings of the Procedure:  Patient was noted to have a large left breast mass which was excised along with surrounding breast tissue 6.5 cm by 5.5 cm x 3.5 cm.  This was a biopsy-proven fibroadenoma.  Additional adjacent best tissue was excised in 2 separate specimens measuring 6 cm x 4 cm x 1.5 cm and 9 cm x 6.5 x 1.5 cm.  Wound was closed over a drain.  Assistant(s):       Complications: No    Estimated Blood Loss (EBL): 25 cc    Implants: * No implants in log *    Specimens:     1.  left breast mass,     2.  additional breast tissue, left breast  Specimen (24h ago, onward)       Start     Ordered    11/20/24 1136  Surgical Pathology  RELEASE UPON ORDERING         11/20/24 1136                     Condition: Good      Complications:  None

## 2024-11-20 NOTE — PLAN OF CARE
1225 RECEIVED FROM OR RESTING QUIETLY NO DISTRESS NOTED. DRESSING TO EFT BREAST C/D/I. SUKHI DRAIN INTACT TO SITE. WILL CONTINUE TO MONITOR.    1235 NOTIFIED SON OF PATIENT STATUS    1310 DR RUSSO NOTIFIED OF PATIENT'S BP, NEW ORDERS GIVEN.    1345 TRANSFERRED TO [facility]3 NO DISTRESS NOTED. REPORT GIVEN TO IRINA MARKS. 174/88 72 99% SON AT BEDSIDE. AWAKE AND ALERT.

## 2024-11-21 LAB
ESTROGEN SERPL-MCNC: NORMAL PG/ML
INSULIN SERPL-ACNC: NORMAL U[IU]/ML
LAB AP GROSS DESCRIPTION: NORMAL
LAB AP LABORATORY NOTES: NORMAL
T3RU NFR SERPL: NORMAL %

## 2024-11-21 NOTE — DISCHARGE SUMMARY
[facility]  Discharge Note  Short Stay    Procedure(s) (LRB):  EXCISION, MASS, BREAST (Left)      OUTCOME: Patient tolerated treatment/procedure well without complication and is now ready for discharge.    DISPOSITION: Home or Self Care    FINAL DIAGNOSIS:  Mass of left breast    FOLLOWUP: In clinic    DISCHARGE INSTRUCTIONS:    Discharge Procedure Orders   Diet general     Remove dressing in 48 hours   Order Comments: Leave steristrips on     Call MD for:  temperature >100.4     Call MD for:  persistent nausea and vomiting     Call MD for:  severe uncontrolled pain     Call MD for:  difficulty breathing, headache or visual disturbances     Nursing communication   Order Comments: SUKHI drain instructions to patient and family prior to discharge.  Empty, record amounts, recharge twice daily.   Teach patient and family how to empty and recharge.     Shower on day dressing removed (No bath)        TIME SPENT ON DISCHARGE: 5 minutes

## 2024-11-25 ENCOUNTER — OFFICE VISIT (OUTPATIENT)
Dept: FAMILY MEDICINE | Facility: CLINIC | Age: 89
End: 2024-11-25
Payer: MEDICARE

## 2024-11-25 VITALS
HEIGHT: 69 IN | HEART RATE: 80 BPM | WEIGHT: 154 LBS | BODY MASS INDEX: 22.81 KG/M2 | RESPIRATION RATE: 18 BRPM | SYSTOLIC BLOOD PRESSURE: 136 MMHG | TEMPERATURE: 97 F | OXYGEN SATURATION: 96 % | DIASTOLIC BLOOD PRESSURE: 68 MMHG

## 2024-11-25 DIAGNOSIS — N63.20 MASS OF LEFT BREAST, UNSPECIFIED QUADRANT: Primary | ICD-10-CM

## 2024-11-25 PROCEDURE — 99213 OFFICE O/P EST LOW 20 MIN: CPT | Mod: ,,, | Performed by: INTERNAL MEDICINE

## 2024-11-25 NOTE — PROGRESS NOTES
"Subjective:       Patient ID: Khoa Phan is a 89 y.o. male.    Chief Complaint: Hypertension and Health Maintenance (Pt refused care gaps )    HPI  .  Patient seen and evaluated he is here in follow up after removal of a benign breast mass.  He denies chest pain shortness for breath mass was removed by Dr. Jarrett nova.  Current Medications:    Current Outpatient Medications:     acetaminophen-codeine 300-30mg (TYLENOL #3) 300-30 mg Tab, Take 1 tablet by mouth., Disp: , Rfl:     amLODIPine (NORVASC) 10 MG tablet, Take 1 tablet (10 mg total) by mouth once daily., Disp: 90 tablet, Rfl: 1    bicalutamide (CASODEX) 50 MG Tab, Take 1 tablet (50 mg total) by mouth once daily., Disp: 90 tablet, Rfl: 1    gabapentin (NEURONTIN) 100 MG capsule, Take 1 capsule (100 mg total) by mouth every evening., Disp: 30 capsule, Rfl: 2    hydroCHLOROthiazide (HYDRODIURIL) 25 MG tablet, Take 1 tablet (25 mg total) by mouth once daily., Disp: 90 tablet, Rfl: 1    HYDROcodone-acetaminophen (NORCO) 7.5-325 mg per tablet, Take 1 tablet by mouth every 6 (six) hours as needed., Disp: 24 tablet, Rfl: 0    lisinopriL (PRINIVIL,ZESTRIL) 40 MG tablet, Take 1 tablet (40 mg total) by mouth once daily., Disp: 90 tablet, Rfl: 1    terazosin (HYTRIN) 2 MG capsule, TAKE 1 CAPSULE(2 MG) BY MOUTH EVERY EVENING, Disp: 90 capsule, Rfl: 1    timolol maleate 0.5% (TIMOPTIC) 0.5 % Drop, INSTILL 1 DROP INTO BOTH EYES IN THE MORNING, Disp: , Rfl:     travoprost (TRAVATAN Z) 0.004 % ophthalmic solution, Place 1 drop into both eyes nightly., Disp: , Rfl:            ROS  Twelve point system reviewed, unremarkable except for stated above in HPI.        Objective:         Vitals:    11/25/24 0935   BP: 136/68   BP Location: Left arm   Patient Position: Sitting   Pulse: 80   Resp: 18   Temp: 97.1 °F (36.2 °C)   TempSrc: Temporal   SpO2: 96%   Weight: 69.9 kg (154 lb)   Height: 5' 9" (1.753 m)        Physical Exam     Patient is awake alert oriented person place " and  Lungs are clear to auscultation bilaterally no crackles or wheezes   Cardiovascular S1-S2 regular rate and rhythm no murmurs rubs or gallops   Abdomen is soft positive bowel sounds nontender, extremities no clubbing cyanosis edema  Neuro no focal neurological deficits  Skin warm and dry.     Last Labs:     Admission on 11/20/2024, Discharged on 11/20/2024   Component Date Value    Case Report 11/20/2024                      Value:Surgical Pathology                                Case: [id number]                                   Authorizing Provider:  Jarrett Carrillo MD          Collected:           11/20/2024 11:35 AM          Ordering Location:     [facility] -     Received:            11/20/2024 11:50 AM                                 [facility]                                                              Pathologist:           Jacques Nobles MD                                                      Specimens:   A) - Breast, Left, left breast mass                                                                 B) - Breast, Left, left breast mass                                                        Final Diagnosis 11/20/2024                      Value:A. 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 Left breast mass, excisional biopsy:   - Immature fibroadenoma with associated adenosis  - Gynecomastia  - Pseudoangiomatous stromal hyperplasia  - No overt malignancy is identified      Gross Description 11/20/2024                      Value:A. Breast, Left: left breast mass  The specimen is received fresh designated left breast mass and consists of an excisional biopsy that measures 6.5 x 5.5 x 3.5 cm.  It is unoriented.  The external surfaces are marked with black ink.  Specimen radiographs are not included.  Sectioning demonstrates heterogenous white-tan fibrous and yellow-tan lobular adipose tissue and a firm, well-circumscribed,  white-tan mass measuring 3.6 cm in greatest dimension.  The mass approaches the nearest inked margin.  No other masses or lesions are identified.  Specimen is sectioned and placed in formalin at 12:09 p.m..  Representative sections of the mass are submitted in cassettes A1-A2.    Fixative: 10% Neutral Buffered Formalin  Cold Ischemia Time:  34 minutes  Fixation Time:  8 HOURS 51 minutes    Grossing was completed by Hemant Slaughter.  B. Breast, Left: left breast mass  The specimen is received fresh designated left breast mass and consists 2 excisional biopsy specimens.  The 1st specimen                           measures 6.0 x 4.2 x 1.5 cm.  The 2nd specimen measures 9.2 x 6.5 x 1.6 cm.  The specimens are unoriented.  The external surfaces are marked with black.  Specimen radiographs are not.  Sectioning demonstrates heterogenous white-tan fibrous and yellow-tan lobular adipose tissue.  No definitive masses or lesions are identified in the 1st specimen.  In the 2nd specimen, two pink-tan firm nodules that measures from 0.7-1.0 cm in greatest dimension is identified near the inked margin.  Additionally there is a white-tan area of vague nodularity identified that measure 0.5 cm in greatest dimension.  No other masses or lesions are identified.  Representative sections are submitted as follows:  B1-B2 representative sections from 1st specimen, B3 area of vague nodularity from 2nd specimen, B4-B5 representative sections of pink-tan nodules from 2nd specimen, B6 representative section breast parenchyma from elsewhere.    Fixative: 10% Neutral Buffered Formalin  Cold Ischemia Time:  29                           minutes  Fixation Time:  8 HOURS 45 minutes    Grossing was completed by Hemant Slaughter.      Microscopic Description 11/20/2024                      Value:A microscopic examination was performed and the diagnosis reflects the findings.          Laboratory Notes 11/20/2024                      Value:If this  report includes immunohistochemical (IHC) test results, please note the following: IHC studies were interpreted in conjunction with appropriate positive and negative controls which demonstrate the expected positive and negative reactivity. This laboratory is regulated under CLIA as qualified to perform high-complexity testing. IHC tests are used for clinical purposes. They should not be regarded as investigational or research.       Lab Visit on 11/12/2024   Component Date Value    Sodium 11/12/2024 135 (L)     Potassium 11/12/2024 3.9     Chloride 11/12/2024 103     CO2 11/12/2024 28     Anion Gap 11/12/2024 8     Glucose 11/12/2024 87     BUN 11/12/2024 24 (H)     Creatinine 11/12/2024 2.11 (H)     BUN/Creatinine Ratio 11/12/2024 11     Calcium 11/12/2024 9.0     Total Protein 11/12/2024 7.5     Albumin 11/12/2024 3.5     Globulin 11/12/2024 4.0     A/G Ratio 11/12/2024 0.9     Bilirubin, Total 11/12/2024 0.3     Alk Phos 11/12/2024 46     ALT 11/12/2024 14 (L)     AST 11/12/2024 13 (L)     eGFR 11/12/2024 29 (L)     WBC 11/12/2024 5.35     RBC 11/12/2024 3.02 (L)     Hemoglobin 11/12/2024 9.0 (L)     Hematocrit 11/12/2024 27.6 (L)     MCV 11/12/2024 91.4     MCH 11/12/2024 29.8     MCHC 11/12/2024 32.6     RDW 11/12/2024 11.9     Platelet Count 11/12/2024 184     MPV 11/12/2024 9.8     Neutrophils % 11/12/2024 58.3     Lymphocytes % 11/12/2024 23.7 (L)     Monocytes % 11/12/2024 15.9 (H)     Eosinophils % 11/12/2024 1.3     Basophils % 11/12/2024 0.6     Immature Granulocytes % 11/12/2024 0.2     nRBC, Auto 11/12/2024 0.0     Neutrophils, Abs 11/12/2024 3.12     Lymphocytes, Absolute 11/12/2024 1.27     Monocytes, Absolute 11/12/2024 0.85 (H)     Eosinophils, Absolute 11/12/2024 0.07     Basophils, Absolute 11/12/2024 0.03     Immature Granulocytes, A* 11/12/2024 0.01     nRBC, Absolute 11/12/2024 0.00     Diff Type 11/12/2024 Auto    Clinical Support on 11/12/2024   Component Date Value    QRS Duration  11/12/2024 86     [facility] QTC Calculation 11/12/2024 461    Office Visit on 11/06/2024   Component Date Value    Case Report 11/06/2024                      Value:Surgical Pathology                                Case: [id number]                                   Authorizing Provider:  Jarrett Carrillo MD          Collected:           11/06/2024 03:47 PM          Ordering Location:     [facility] Received:            11/07/2024 08:20 AM                                 - General Surgery                                                            Pathologist:           Jacques Nobles MD                                                      Specimen:    Breast, Left                                                                               Final Diagnosis 11/06/2024                      Value:A. Left breast mass, needle core biopsy:  - Benign-appearing fibroepithelial lesion, favor fibroadenoma      Gross Description 11/06/2024                      Value:A. Breast, Left:   The specimen is received in formalin designated breast, left and consists of multiple stringy white-tan needle core biopsy fragments that measure from 0.1-1.1 cm in length and are entirely submitted in cassette A1.    Fixative: 10% Neutral Buffered Formalin  Cold Ischemia Time:  < 1 minute  Fixation Time:  29 HOURS 13 minutes    Grossing was completed by Hemant Slaughter.      Microscopic Description 11/06/2024                      Value:A microscopic examination was performed and the diagnosis reflects the findings.          Clinical Information 11/06/2024                      Value:left breast mass    Laboratory Notes 11/06/2024                      Value:If this report includes immunohistochemical (IHC) test results, please note the following: IHC studies were interpreted in conjunction with appropriate positive and negative controls which demonstrate the expected positive and negative reactivity. This laboratory is regulated  under CLIA as qualified to perform high-complexity testing. IHC tests are used for clinical purposes. They should not be regarded as investigational or research.           Last Imaging:  Mammo Digital Diagnostic Bilat with Tj, US Breast Bilateral Complete  Narrative: Facility:  [address] [place] [id number] [phone]    Name: Khoa Phan    MRN: [id number]    Result:  Mammo Digital Diagnostic Bilat with Tj  US Breast Bilateral Complete    History:  Patient is 89 y.o. MALE and is seen for diagnostic imaging.  History of prostate cancer since 2003   Left breast lump x4 months     Films Compared:  Prior images (if available) were compared.     Findings:  This procedure was performed using tomosynthesis.   Computer-aided detection was utilized in the interpretation of this   examination.    The breasts are extremely dense, which lowers the sensitivity of   mammography.   Bilateral gynecomastia is present with very dense glandular tissues.     There is a circumscribed 4.1 cm mass in the 12 o'clock position of the   left breast.  No associated microcalcifications are seen.  No focal   lesions are seen in the right breast.     A breast examination was performed in conjunction with breast ultrasound.    All four breast quadrants and the retroareolar regions were scanned.      The background tissue is homogeneous - fibroglandular..  Ultrasound of the   left breast revealed a solid 4.9 x 2.9 x 4.1 cm mass extending from the 12   to 9 o'clock position approximately 4 cm radially from the nipple.  In the   9 o'clock position there is a similar appearing oval circumscribed 1.4 x   0.5 x 1.4 cm nodule.  No pathologic adenopathy is seen.     In the right breast, there is an oval hypoechoic nodule in the 12 o'clock   position adjacent to the nipple that measures 7 x 5 x 7 mm.  No internal   vascularity is seen.  No adenopathy is seen in the right breast.     The patient stated he  underwent needle biopsy by Dr. Carrillo last week and   pathology from the left breast biopsy from the palpable abnormality in the   12 o'clock position revealed a benign fibroepithelial lesion, favor   fibroadenoma.   Impression:    The 4.9 x 4 cm mass in the 12 o'clock position of the left breast is   circumscribed and oval which would be consistent with the pathology report   of a fibroadenoma.  There is an additional 1.4 cm similar nodule in the 9   o'clock position.  In the right breast, there is a 7 mm oval nodule which   could represent either a fibroadenoma or a complicated cyst.    The large mass in the 12 o'clock position of the left breast is extremely   firm and excisional biopsy is recommended.  The patient will follow-up   with Dr. Carrillo tomorrow for further treatment planning.     BI-RADS CATEGORY 2 - Benign finding.     Recommendation:Excisional biopsy is recommended due to the large size of   the palpable left breast mass.     Leelee Dyson MD         **Labs and x-rays personally reviewed by me    ** reviewed           Assessment & Plan:       1. Mass of left breast, unspecified quadrant  Patient is status post biopsy removal of the left breast mass.    It was benign.  He does have a SUKHI drain in place.  He voiced no complaints today.              Jabari Stafford MD

## 2024-11-26 ENCOUNTER — OFFICE VISIT (OUTPATIENT)
Dept: SURGERY | Facility: CLINIC | Age: 89
End: 2024-11-26
Attending: SURGERY
Payer: MEDICARE

## 2024-11-26 VITALS — WEIGHT: 154 LBS | HEIGHT: 69 IN | BODY MASS INDEX: 22.81 KG/M2

## 2024-11-26 DIAGNOSIS — Z09 POSTOP CHECK: Primary | ICD-10-CM

## 2024-11-26 PROCEDURE — 99999 PR PBB SHADOW E&M-EST. PATIENT-LVL III: CPT | Mod: PBBFAC,,, | Performed by: SURGERY

## 2024-11-26 PROCEDURE — 99024 POSTOP FOLLOW-UP VISIT: CPT | Mod: ,,, | Performed by: SURGERY

## 2024-11-26 PROCEDURE — 99213 OFFICE O/P EST LOW 20 MIN: CPT | Mod: PBBFAC | Performed by: SURGERY

## 2024-11-26 NOTE — PROGRESS NOTES
Status post left breast biopsy.  Pathology was benign.  Patient reports no issues.  He has a drain in place which has less than 10 cc per day.  Drain was removed today.    Incision appears to be healing nicely with Steri-Strips in place.  Remove Steri-Strips in another week.  Drain dressing can be removed in 2 days.

## 2024-11-30 ENCOUNTER — EXTERNAL CHRONIC CARE MANAGEMENT (OUTPATIENT)
Dept: FAMILY MEDICINE | Facility: CLINIC | Age: 89
End: 2024-11-30
Payer: MEDICARE

## 2024-11-30 PROCEDURE — G0511 CCM/BHI BY RHC/FQHC 20MIN MO: HCPCS | Mod: ,,, | Performed by: INTERNAL MEDICINE

## 2024-12-18 ENCOUNTER — DOCUMENT SCAN (OUTPATIENT)
Dept: HOME HEALTH SERVICES | Facility: HOSPITAL | Age: 89
End: 2024-12-18
Payer: MEDICARE

## 2024-12-31 ENCOUNTER — EXTERNAL CHRONIC CARE MANAGEMENT (OUTPATIENT)
Dept: FAMILY MEDICINE | Facility: CLINIC | Age: 89
End: 2024-12-31
Payer: MEDICARE

## 2024-12-31 PROCEDURE — G0511 CCM/BHI BY RHC/FQHC 20MIN MO: HCPCS | Mod: ,,, | Performed by: INTERNAL MEDICINE

## 2025-01-23 ENCOUNTER — OFFICE VISIT (OUTPATIENT)
Dept: FAMILY MEDICINE | Facility: CLINIC | Age: OVER 89
End: 2025-01-23
Payer: MEDICARE

## 2025-01-23 VITALS
HEIGHT: 69 IN | DIASTOLIC BLOOD PRESSURE: 75 MMHG | TEMPERATURE: 98 F | BODY MASS INDEX: 22.66 KG/M2 | HEART RATE: 77 BPM | OXYGEN SATURATION: 95 % | SYSTOLIC BLOOD PRESSURE: 122 MMHG | WEIGHT: 153 LBS | RESPIRATION RATE: 18 BRPM

## 2025-01-23 DIAGNOSIS — N63.20 MASS OF LEFT BREAST, UNSPECIFIED QUADRANT: Primary | ICD-10-CM

## 2025-01-23 DIAGNOSIS — Z09 POSTOP CHECK: ICD-10-CM

## 2025-01-23 DIAGNOSIS — I10 ESSENTIAL (PRIMARY) HYPERTENSION: ICD-10-CM

## 2025-01-23 DIAGNOSIS — N40.0 BENIGN PROSTATIC HYPERPLASIA, UNSPECIFIED WHETHER LOWER URINARY TRACT SYMPTOMS PRESENT: ICD-10-CM

## 2025-01-23 DIAGNOSIS — G62.9 NEUROPATHY: ICD-10-CM

## 2025-01-23 PROCEDURE — 99214 OFFICE O/P EST MOD 30 MIN: CPT | Mod: ,,, | Performed by: INTERNAL MEDICINE

## 2025-01-23 RX ORDER — AMLODIPINE BESYLATE 10 MG/1
10 TABLET ORAL DAILY
Qty: 90 TABLET | Refills: 1 | Status: SHIPPED | OUTPATIENT
Start: 2025-01-23

## 2025-01-23 RX ORDER — HYDROCHLOROTHIAZIDE 25 MG/1
25 TABLET ORAL DAILY
Qty: 90 TABLET | Refills: 1 | Status: SHIPPED | OUTPATIENT
Start: 2025-01-23

## 2025-01-23 RX ORDER — GABAPENTIN 100 MG/1
100 CAPSULE ORAL NIGHTLY
Qty: 30 CAPSULE | Refills: 2 | Status: SHIPPED | OUTPATIENT
Start: 2025-01-23

## 2025-01-23 RX ORDER — TERAZOSIN 2 MG/1
CAPSULE ORAL
Qty: 90 CAPSULE | Refills: 1 | Status: SHIPPED | OUTPATIENT
Start: 2025-01-23

## 2025-01-23 NOTE — PROGRESS NOTES
Subjective:       Patient ID: Khoa Phan is a 89 y.o. male.    Chief Complaint: Hypertension    History of Present Illness    CHIEF COMPLAINT:  Patient presents today for follow-up after a biopsy.    BIOPSY RESULTS:  He is aware of his recent biopsy results which showed benign findings.    BLOOD PRESSURE MONITORING:  He reports higher blood pressure readings at home compared to nurse measurements. He currently uses a wrist blood pressure monitor for home measurements.         Current Medications:    Current Outpatient Medications:     bicalutamide (CASODEX) 50 MG Tab, Take 1 tablet (50 mg total) by mouth once daily., Disp: 90 tablet, Rfl: 1    lisinopriL (PRINIVIL,ZESTRIL) 40 MG tablet, Take 1 tablet (40 mg total) by mouth once daily., Disp: 90 tablet, Rfl: 1    timolol maleate 0.5% (TIMOPTIC) 0.5 % Drop, INSTILL 1 DROP INTO BOTH EYES IN THE MORNING, Disp: , Rfl:     travoprost (TRAVATAN Z) 0.004 % ophthalmic solution, Place 1 drop into both eyes nightly., Disp: , Rfl:     acetaminophen-codeine 300-30mg (TYLENOL #3) 300-30 mg Tab, Take 1 tablet by mouth. (Patient not taking: Reported on 1/23/2025), Disp: , Rfl:     amLODIPine (NORVASC) 10 MG tablet, Take 1 tablet (10 mg total) by mouth once daily., Disp: 90 tablet, Rfl: 1    gabapentin (NEURONTIN) 100 MG capsule, Take 1 capsule (100 mg total) by mouth every evening., Disp: 30 capsule, Rfl: 2    hydroCHLOROthiazide (HYDRODIURIL) 25 MG tablet, Take 1 tablet (25 mg total) by mouth once daily., Disp: 90 tablet, Rfl: 1    HYDROcodone-acetaminophen (NORCO) 7.5-325 mg per tablet, Take 1 tablet by mouth every 6 (six) hours as needed. (Patient not taking: Reported on 1/23/2025), Disp: 24 tablet, Rfl: 0    terazosin (HYTRIN) 2 MG capsule, TAKE 1 CAPSULE(2 MG) BY MOUTH EVERY EVENING, Disp: 90 capsule, Rfl: 1           ROS  Twelve point system reviewed, unremarkable except for stated above in HPI.        Objective:         Vitals:    01/23/25 0932   BP: 122/75   BP  "Location: Right arm   Patient Position: Sitting   Pulse: 77   Resp: 18   Temp: 97.8 °F (36.6 °C)   TempSrc: Tympanic   SpO2: 95%   Weight: 69.4 kg (153 lb)   Height: 5' 9" (1.753 m)        Physical Exam     Patient is awake alert oriented person place and  Lungs are clear to auscultation bilaterally no crackles or wheezes   Cardiovascular S1-S2 regular rate and rhythm no murmurs rubs or gallops   Abdomen is soft positive bowel sounds nontender, extremities no clubbing cyanosis edema  Neuro no focal neurological deficits  Skin warm and dry.     Last Labs:     No visits with results within 1 Month(s) from this visit.   Latest known visit with results is:   Admission on 11/20/2024, Discharged on 11/20/2024   Component Date Value    Case Report 11/20/2024                      Value:Surgical Pathology                                Case: [id number]                                   Authorizing Provider:  Jarrett Carrillo MD          Collected:           11/20/2024 11:35 AM          Ordering Location:     [facility] -     Received:            11/20/2024 11:50 AM                                 [facility]                                                              Pathologist:           Jacques Nobles MD                                                      Specimens:   A) - Breast, Left, left breast mass                                                                 B) - Breast, Left, left breast mass                                                        Final Diagnosis 11/20/2024                      Value:A. 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 Left breast mass, excisional biopsy:   - Immature fibroadenoma with associated adenosis  - Gynecomastia  - Pseudoangiomatous stromal hyperplasia  - No overt malignancy is identified      Gross Description 11/20/2024                      Value:A. Breast, Left: left breast mass  The specimen is " received fresh designated left breast mass and consists of an excisional biopsy that measures 6.5 x 5.5 x 3.5 cm.  It is unoriented.  The external surfaces are marked with black ink.  Specimen radiographs are not included.  Sectioning demonstrates heterogenous white-tan fibrous and yellow-tan lobular adipose tissue and a firm, well-circumscribed, white-tan mass measuring 3.6 cm in greatest dimension.  The mass approaches the nearest inked margin.  No other masses or lesions are identified.  Specimen is sectioned and placed in formalin at 12:09 p.m..  Representative sections of the mass are submitted in cassettes A1-A2.    Fixative: 10% Neutral Buffered Formalin  Cold Ischemia Time:  34 minutes  Fixation Time:  8 HOURS 51 minutes    Grossing was completed by Hemant Slaughter.  B. Breast, Left: left breast mass  The specimen is received fresh designated left breast mass and consists 2 excisional biopsy specimens.  The 1st specimen                           measures 6.0 x 4.2 x 1.5 cm.  The 2nd specimen measures 9.2 x 6.5 x 1.6 cm.  The specimens are unoriented.  The external surfaces are marked with black.  Specimen radiographs are not.  Sectioning demonstrates heterogenous white-tan fibrous and yellow-tan lobular adipose tissue.  No definitive masses or lesions are identified in the 1st specimen.  In the 2nd specimen, two pink-tan firm nodules that measures from 0.7-1.0 cm in greatest dimension is identified near the inked margin.  Additionally there is a white-tan area of vague nodularity identified that measure 0.5 cm in greatest dimension.  No other masses or lesions are identified.  Representative sections are submitted as follows:  B1-B2 representative sections from 1st specimen, B3 area of vague nodularity from 2nd specimen, B4-B5 representative sections of pink-tan nodules from 2nd specimen, B6 representative section breast parenchyma from elsewhere.    Fixative: 10% Neutral Buffered Formalin  Cold Ischemia  Time:  29                           minutes  Fixation Time:  8 HOURS 45 minutes    Grossing was completed by Hemant Slaughter.      Microscopic Description 11/20/2024                      Value:A microscopic examination was performed and the diagnosis reflects the findings.          Laboratory Notes 11/20/2024                      Value:If this report includes immunohistochemical (IHC) test results, please note the following: IHC studies were interpreted in conjunction with appropriate positive and negative controls which demonstrate the expected positive and negative reactivity. This laboratory is regulated under CLIA as qualified to perform high-complexity testing. IHC tests are used for clinical purposes. They should not be regarded as investigational or research.           Last Imaging:  Mammo Digital Diagnostic Bilat with Tj, US Breast Bilateral Complete  Narrative: Facility:  [address] [address] [phone]    Name: Khoa Phan    MRN: [id number]    Result:  Mammo Digital Diagnostic Bilat with Tj  US Breast Bilateral Complete    History:  Patient is 89 y.o. MALE and is seen for diagnostic imaging.  History of prostate cancer since 2003   Left breast lump x4 months     Films Compared:  Prior images (if available) were compared.     Findings:  This procedure was performed using tomosynthesis.   Computer-aided detection was utilized in the interpretation of this   examination.    The breasts are extremely dense, which lowers the sensitivity of   mammography.   Bilateral gynecomastia is present with very dense glandular tissues.     There is a circumscribed 4.1 cm mass in the 12 o'clock position of the   left breast.  No associated microcalcifications are seen.  No focal   lesions are seen in the right breast.     A breast examination was performed in conjunction with breast ultrasound.    All four breast quadrants and the retroareolar regions were scanned.       The background tissue is homogeneous - fibroglandular..  Ultrasound of the   left breast revealed a solid 4.9 x 2.9 x 4.1 cm mass extending from the 12   to 9 o'clock position approximately 4 cm radially from the nipple.  In the   9 o'clock position there is a similar appearing oval circumscribed 1.4 x   0.5 x 1.4 cm nodule.  No pathologic adenopathy is seen.     In the right breast, there is an oval hypoechoic nodule in the 12 o'clock   position adjacent to the nipple that measures 7 x 5 x 7 mm.  No internal   vascularity is seen.  No adenopathy is seen in the right breast.     The patient stated he underwent needle biopsy by Dr. Carrillo last week and   pathology from the left breast biopsy from the palpable abnormality in the   12 o'clock position revealed a benign fibroepithelial lesion, favor   fibroadenoma.   Impression:    The 4.9 x 4 cm mass in the 12 o'clock position of the left breast is   circumscribed and oval which would be consistent with the pathology report   of a fibroadenoma.  There is an additional 1.4 cm similar nodule in the 9   o'clock position.  In the right breast, there is a 7 mm oval nodule which   could represent either a fibroadenoma or a complicated cyst.    The large mass in the 12 o'clock position of the left breast is extremely   firm and excisional biopsy is recommended.  The patient will follow-up   with Dr. Carrillo tomorrow for further treatment planning.     BI-RADS CATEGORY 2 - Benign finding.     Recommendation:Excisional biopsy is recommended due to the large size of   the palpable left breast mass.     Leelee Dyson MD         **Labs and x-rays personally reviewed by me    ** reviewed           Assessment & Plan:   Assessment & Plan    IMPRESSION:  - Reviewed biopsy results, confirming benign findings  - Examined chest, noted satisfactory healing  - Assessed heart and lung sounds, found to be normal  - Evaluated blood pressure, noted as within normal range    PROSTATE  CANCER:  - Noted that the patient's previous biopsy was benign.    TYPE 2 DIABETES MELLITUS WITHOUT COMPLICATION, WITHOUT LONG-TERM CURRENT USE OF INSULIN:  - Refilled the patient's current medications.    CHRONIC KIDNEY DISEASE, STAGE IV (SEVERE):  - Performed physical exam, including auscultation of heart and lung sounds.  - Blood pressure was measured and found to be within normal limits.  - Noted patient's report of discrepancy in home blood pressure readings.  - Advised patient to use an upper arm blood pressure monitor and check at least 3 times per week, instructing to report any abnormal readings.  - Scheduled next appointment for follow-up.    HYPERTENSION:  - Discussed the importance of home blood pressure monitoring using an upper arm cuff for more precise readings.  - Instructed patient to contact the office if readings are above 150s or 160s.  - Directed nurse to call if abnormal readings are observed.    POST-BIOPSY FOLLOW-UP:  - Evaluated the healing of previous biopsy site.    FOLLOW UP:  - Scheduled follow-up visit in July.           1. Mass of left breast, unspecified quadrant  The mass has been removed, patient is stable   2. Postop check  Stable  3. Essential (primary) hypertension  4. Benign prostatic hyperplasia, unspecified whether lower urinary tract symptoms present  5. Neuropathy  -     amLODIPine (NORVASC) 10 MG tablet; Take 1 tablet (10 mg total) by mouth once daily.  Dispense: 90 tablet; Refill: 1  -     gabapentin (NEURONTIN) 100 MG capsule; Take 1 capsule (100 mg total) by mouth every evening.  Dispense: 30 capsule; Refill: 2  -     hydroCHLOROthiazide (HYDRODIURIL) 25 MG tablet; Take 1 tablet (25 mg total) by mouth once daily.  Dispense: 90 tablet; Refill: 1  -     terazosin (HYTRIN) 2 MG capsule; TAKE 1 CAPSULE(2 MG) BY MOUTH EVERY EVENING  Dispense: 90 capsule; Refill: 1            Jabari Stafford MD  This note was generated with the assistance of ambient listening technology.  Verbal consent was obtained by the patient and accompanying visitor(s) for the recording of patient appointment to facilitate this note. I attest to having reviewed and edited the generated note for accuracy, though some syntax or spelling errors may persist. Please contact the author of this note for any clarification.

## 2025-01-31 ENCOUNTER — EXTERNAL CHRONIC CARE MANAGEMENT (OUTPATIENT)
Dept: FAMILY MEDICINE | Facility: CLINIC | Age: OVER 89
End: 2025-01-31
Payer: MEDICARE

## 2025-01-31 PROCEDURE — G0511 CCM/BHI BY RHC/FQHC 20MIN MO: HCPCS | Mod: ,,, | Performed by: INTERNAL MEDICINE

## 2025-02-28 ENCOUNTER — EXTERNAL CHRONIC CARE MANAGEMENT (OUTPATIENT)
Dept: FAMILY MEDICINE | Facility: CLINIC | Age: OVER 89
End: 2025-02-28
Payer: MEDICARE

## 2025-02-28 PROCEDURE — G0511 CCM/BHI BY RHC/FQHC 20MIN MO: HCPCS | Mod: ,,, | Performed by: INTERNAL MEDICINE

## 2025-03-31 ENCOUNTER — EXTERNAL CHRONIC CARE MANAGEMENT (OUTPATIENT)
Dept: FAMILY MEDICINE | Facility: CLINIC | Age: OVER 89
End: 2025-03-31
Payer: MEDICARE

## 2025-03-31 PROCEDURE — G0511 CCM/BHI BY RHC/FQHC 20MIN MO: HCPCS | Mod: ,,, | Performed by: INTERNAL MEDICINE

## 2025-04-21 RX ORDER — TERAZOSIN 2 MG/1
CAPSULE ORAL
Qty: 90 CAPSULE | Refills: 1 | Status: SHIPPED | OUTPATIENT
Start: 2025-04-21

## 2025-04-30 ENCOUNTER — EXTERNAL CHRONIC CARE MANAGEMENT (OUTPATIENT)
Dept: FAMILY MEDICINE | Facility: CLINIC | Age: OVER 89
End: 2025-04-30
Payer: MEDICARE

## 2025-04-30 PROCEDURE — 99490 CHRNC CARE MGMT STAFF 1ST 20: CPT | Mod: ,,, | Performed by: INTERNAL MEDICINE

## 2025-05-31 ENCOUNTER — EXTERNAL CHRONIC CARE MANAGEMENT (OUTPATIENT)
Dept: INTERNAL MEDICINE | Facility: CLINIC | Age: OVER 89
End: 2025-05-31
Payer: MEDICARE

## 2025-05-31 PROCEDURE — 99490 CHRNC CARE MGMT STAFF 1ST 20: CPT | Mod: S$PBB,,, | Performed by: INTERNAL MEDICINE

## 2025-05-31 PROCEDURE — 99490 CHRNC CARE MGMT STAFF 1ST 20: CPT | Mod: PBBFAC | Performed by: INTERNAL MEDICINE

## 2025-06-19 RX ORDER — GABAPENTIN 100 MG/1
100 CAPSULE ORAL NIGHTLY
Qty: 90 CAPSULE | Refills: 2 | Status: SHIPPED | OUTPATIENT
Start: 2025-06-19

## 2025-06-30 ENCOUNTER — EXTERNAL CHRONIC CARE MANAGEMENT (OUTPATIENT)
Dept: INTERNAL MEDICINE | Facility: CLINIC | Age: OVER 89
End: 2025-06-30
Payer: MEDICARE

## 2025-06-30 PROCEDURE — 99490 CHRNC CARE MGMT STAFF 1ST 20: CPT | Mod: ,,, | Performed by: INTERNAL MEDICINE

## 2025-07-23 ENCOUNTER — OFFICE VISIT (OUTPATIENT)
Dept: FAMILY MEDICINE | Facility: CLINIC | Age: OVER 89
End: 2025-07-23
Payer: MEDICARE

## 2025-07-23 VITALS
DIASTOLIC BLOOD PRESSURE: 71 MMHG | OXYGEN SATURATION: 99 % | HEART RATE: 75 BPM | WEIGHT: 152 LBS | HEIGHT: 69 IN | SYSTOLIC BLOOD PRESSURE: 124 MMHG | BODY MASS INDEX: 22.51 KG/M2 | TEMPERATURE: 97 F | RESPIRATION RATE: 17 BRPM

## 2025-07-23 DIAGNOSIS — G62.9 NEUROPATHY: Primary | ICD-10-CM

## 2025-07-23 PROCEDURE — 99212 OFFICE O/P EST SF 10 MIN: CPT | Mod: ,,, | Performed by: INTERNAL MEDICINE

## 2025-07-23 NOTE — PROGRESS NOTES
"Subjective:       Patient ID: Khoa Phan is a 89 y.o. male.    Chief Complaint: Breast Mass and Health Maintenance (TETANUS VACCINE Never done/Shingles Vaccine(1 of 2) Never done/RSV Vaccine (Age 60+ and Pregnant patients)(1 - 1-dose 75+ series) Never done/Pneumococcal Vaccines (Age 50+)(2 of 2 - PCV) due on 08/17/2023/COVID-19 Vaccine(6 - 2024-25 season) due on 09/01/2024 )    History of Present Illness    CHIEF COMPLAINT:  Patient presents today for follow up.    SURGICAL HISTORY:  He reports prior surgery without complications.         Current Medications:  Current Medications[1]           ROS  Twelve point system reviewed, unremarkable except for stated above in HPI.        Objective:         Vitals:    07/23/25 0927   BP: 124/71   BP Location: Left arm   Patient Position: Sitting   Pulse: 75   Resp: 17   Temp: 97.4 °F (36.3 °C)   TempSrc: Temporal   SpO2: 99%   Weight: 68.9 kg (152 lb)   Height: 5' 9" (1.753 m)        Physical Exam     Patient is awake alert oriented person place and  Lungs are clear to auscultation bilaterally no crackles or wheezes   Cardiovascular S1-S2 regular rate and rhythm no murmurs rubs or gallops   Abdomen is soft positive bowel sounds nontender, extremities no clubbing cyanosis edema  Neuro no focal neurological deficits  Skin warm and dry.     Last Labs:     No visits with results within 1 Month(s) from this visit.   Latest known visit with results is:   Admission on 11/20/2024, Discharged on 11/20/2024   Component Date Value    Case Report 11/20/2024                      Value:Surgical Pathology                                Case: [id number]                                   Authorizing Provider:  Jarrett Carrillo MD          Collected:           11/20/2024 11:35 AM          Ordering Location:     [facility] -     Received:            11/20/2024 11:50 AM                                 [facility] Services                                                            "   Pathologist:           Jacques Nobles MD                                                      Specimens:   A) - Breast, Left, left breast mass                                                                 B) - Breast, Left, left breast mass                                                        Final Diagnosis 11/20/2024                      Value:A. 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 Left breast mass, excisional biopsy:   - Immature fibroadenoma with associated adenosis  - Gynecomastia  - Pseudoangiomatous stromal hyperplasia  - No overt malignancy is identified      Gross Description 11/20/2024                      Value:A. Breast, Left: left breast mass  The specimen is received fresh designated left breast mass and consists of an excisional biopsy that measures 6.5 x 5.5 x 3.5 cm.  It is unoriented.  The external surfaces are marked with black ink.  Specimen radiographs are not included.  Sectioning demonstrates heterogenous white-tan fibrous and yellow-tan lobular adipose tissue and a firm, well-circumscribed, white-tan mass measuring 3.6 cm in greatest dimension.  The mass approaches the nearest inked margin.  No other masses or lesions are identified.  Specimen is sectioned and placed in formalin at 12:09 p.m..  Representative sections of the mass are submitted in cassettes A1-A2.    Fixative: 10% Neutral Buffered Formalin  Cold Ischemia Time:  34 minutes  Fixation Time:  8 HOURS 51 minutes    Grossing was completed by Hemant Slaughter.  B. Breast, Left: left breast mass  The specimen is received fresh designated left breast mass and consists 2 excisional biopsy specimens.  The 1st specimen                           measures 6.0 x 4.2 x 1.5 cm.  The 2nd specimen measures 9.2 x 6.5 x 1.6 cm.  The specimens are unoriented.  The external surfaces are marked with black.  Specimen radiographs are not.  Sectioning demonstrates  heterogenous white-tan fibrous and yellow-tan lobular adipose tissue.  No definitive masses or lesions are identified in the 1st specimen.  In the 2nd specimen, two pink-tan firm nodules that measures from 0.7-1.0 cm in greatest dimension is identified near the inked margin.  Additionally there is a white-tan area of vague nodularity identified that measure 0.5 cm in greatest dimension.  No other masses or lesions are identified.  Representative sections are submitted as follows:  B1-B2 representative sections from 1st specimen, B3 area of vague nodularity from 2nd specimen, B4-B5 representative sections of pink-tan nodules from 2nd specimen, B6 representative section breast parenchyma from elsewhere.    Fixative: 10% Neutral Buffered Formalin  Cold Ischemia Time:  29                           minutes  Fixation Time:  8 HOURS 45 minutes    Grossing was completed by Hemant Slaughter.      Microscopic Description 11/20/2024                      Value:A microscopic examination was performed and the diagnosis reflects the findings.          Laboratory Notes 11/20/2024                      Value:If this report includes immunohistochemical (IHC) test results, please note the following: IHC studies were interpreted in conjunction with appropriate positive and negative controls which demonstrate the expected positive and negative reactivity. This laboratory is regulated under CLIA as qualified to perform high-complexity testing. IHC tests are used for clinical purposes. They should not be regarded as investigational or research.           Last Imaging:  Mammo Digital Diagnostic Bilat with Tj, US Breast Bilateral Complete  Narrative: Facility:  [address] [id number] [phone]    Name: Khoa Phan    MRN: [id number]    Result:  Mammo Digital Diagnostic Bilat with Tj  US Breast Bilateral Complete    History:  Patient is 89 y.o. MALE and is seen for diagnostic  imaging.  History of prostate cancer since 2003   Left breast lump x4 months     Films Compared:  Prior images (if available) were compared.     Findings:  This procedure was performed using tomosynthesis.   Computer-aided detection was utilized in the interpretation of this   examination.    The breasts are extremely dense, which lowers the sensitivity of   mammography.   Bilateral gynecomastia is present with very dense glandular tissues.     There is a circumscribed 4.1 cm mass in the 12 o'clock position of the   left breast.  No associated microcalcifications are seen.  No focal   lesions are seen in the right breast.     A breast examination was performed in conjunction with breast ultrasound.    All four breast quadrants and the retroareolar regions were scanned.      The background tissue is homogeneous - fibroglandular..  Ultrasound of the   left breast revealed a solid 4.9 x 2.9 x 4.1 cm mass extending from the 12   to 9 o'clock position approximately 4 cm radially from the nipple.  In the   9 o'clock position there is a similar appearing oval circumscribed 1.4 x   0.5 x 1.4 cm nodule.  No pathologic adenopathy is seen.     In the right breast, there is an oval hypoechoic nodule in the 12 o'clock   position adjacent to the nipple that measures 7 x 5 x 7 mm.  No internal   vascularity is seen.  No adenopathy is seen in the right breast.     The patient stated he underwent needle biopsy by Dr. Carrillo last week and   pathology from the left breast biopsy from the palpable abnormality in the   12 o'clock position revealed a benign fibroepithelial lesion, favor   fibroadenoma.   Impression:    The 4.9 x 4 cm mass in the 12 o'clock position of the left breast is   circumscribed and oval which would be consistent with the pathology report   of a fibroadenoma.  There is an additional 1.4 cm similar nodule in the 9   o'clock position.  In the right breast, there is a 7 mm oval nodule which   could represent either a  fibroadenoma or a complicated cyst.    The large mass in the 12 o'clock position of the left breast is extremely   firm and excisional biopsy is recommended.  The patient will follow-up   with Dr. Carrillo tomorrow for further treatment planning.     BI-RADS CATEGORY 2 - Benign finding.     Recommendation:Excisional biopsy is recommended due to the large size of   the palpable left breast mass.     Leelee Dyson MD         **Labs and x-rays personally reviewed by me    ** reviewed           Assessment & Plan:   Assessment & Plan    IMPRESSION:  - Performed exam.  - Overall good health status with no reported complaints or problems.  - Considered surgical history, with no current complications identified.           1. Neuropathy  Stable   \          Jabari Stafford MD  This note was generated with the assistance of ambient listening technology. Verbal consent was obtained by the patient and accompanying visitor(s) for the recording of patient appointment to facilitate this note. I attest to having reviewed and edited the generated note for accuracy, though some syntax or spelling errors may persist. Please contact the author of this note for any clarification.   Dictation #1  MRN:[id number]  CSN:[id number]          [1]   Current Outpatient Medications:     amLODIPine (NORVASC) 10 MG tablet, Take 1 tablet (10 mg total) by mouth once daily., Disp: 90 tablet, Rfl: 1    bicalutamide (CASODEX) 50 MG Tab, Take 1 tablet (50 mg total) by mouth once daily., Disp: 90 tablet, Rfl: 1    gabapentin (NEURONTIN) 100 MG capsule, Take 1 capsule (100 mg total) by mouth every evening., Disp: 90 capsule, Rfl: 2    hydroCHLOROthiazide (HYDRODIURIL) 25 MG tablet, Take 1 tablet (25 mg total) by mouth once daily., Disp: 90 tablet, Rfl: 1    lisinopriL (PRINIVIL,ZESTRIL) 40 MG tablet, Take 1 tablet (40 mg total) by mouth once daily., Disp: 90 tablet, Rfl: 1    terazosin (HYTRIN) 2 MG capsule, TAKE 1 CAPSULE(2 MG) BY MOUTH EVERY EVENING,  Disp: 90 capsule, Rfl: 1    timolol maleate 0.5% (TIMOPTIC) 0.5 % Drop, INSTILL 1 DROP INTO BOTH EYES IN THE MORNING, Disp: , Rfl:     travoprost (TRAVATAN Z) 0.004 % ophthalmic solution, Place 1 drop into both eyes nightly., Disp: , Rfl:

## 2025-07-31 ENCOUNTER — EXTERNAL CHRONIC CARE MANAGEMENT (OUTPATIENT)
Dept: FAMILY MEDICINE | Facility: CLINIC | Age: OVER 89
End: 2025-07-31
Payer: MEDICARE

## 2025-07-31 PROCEDURE — 99490 CHRNC CARE MGMT STAFF 1ST 20: CPT | Mod: ,,, | Performed by: INTERNAL MEDICINE

## (undated) DEVICE — STRIP MEDI WND CLSR 1/4X4IN

## (undated) DEVICE — SYR 10CC LUER LOCK

## (undated) DEVICE — DRAIN FLAT HUBLESS FULL 10MM

## (undated) DEVICE — KIT BASIC RUSH

## (undated) DEVICE — GOWN NONREINF SET-IN SLV 2XL

## (undated) DEVICE — SUT CTD VICRYL 3-0 CR/SH

## (undated) DEVICE — SUT ETHILON 2-0 FS 18IN BLK

## (undated) DEVICE — GLOVE SENSICARE PI GRN 6

## (undated) DEVICE — SOL IRRI STRL WATER 1000ML

## (undated) DEVICE — DRESSING TRANS 4X4 TEGADERM

## (undated) DEVICE — RESERVOIR JACKSON-PRATT 100CC

## (undated) DEVICE — GLOVE SENSICARE PI GRN 6.5

## (undated) DEVICE — SUT 4-0 VICRYL / FS-2

## (undated) DEVICE — GLOVE SENSICARE PI SURG 8